# Patient Record
Sex: FEMALE | Race: WHITE | Employment: OTHER | URBAN - METROPOLITAN AREA
[De-identification: names, ages, dates, MRNs, and addresses within clinical notes are randomized per-mention and may not be internally consistent; named-entity substitution may affect disease eponyms.]

---

## 2017-02-20 PROBLEM — R53.83 FATIGUE: Status: ACTIVE | Noted: 2017-02-20

## 2017-08-21 PROBLEM — R42 VERTIGO: Status: ACTIVE | Noted: 2017-08-21

## 2018-01-08 ENCOUNTER — HOSPITAL ENCOUNTER (EMERGENCY)
Facility: HOSPITAL | Age: 83
Discharge: HOME | End: 2018-01-08
Attending: EMERGENCY MEDICINE
Payer: MEDICARE

## 2018-01-08 VITALS
BODY MASS INDEX: 19.63 KG/M2 | HEIGHT: 60 IN | OXYGEN SATURATION: 94 % | RESPIRATION RATE: 14 BRPM | TEMPERATURE: 97 F | WEIGHT: 100 LBS | SYSTOLIC BLOOD PRESSURE: 156 MMHG | DIASTOLIC BLOOD PRESSURE: 73 MMHG | HEART RATE: 96 BPM

## 2018-01-08 DIAGNOSIS — R19.7 DIARRHEA OF PRESUMED INFECTIOUS ORIGIN: Primary | ICD-10-CM

## 2018-01-08 LAB
ALBUMIN SERPL-MCNC: 2.6 G/DL (ref 3.5–4.8)
ALP LIVER SERPL-CCNC: 79 U/L (ref 55–142)
ALT SERPL-CCNC: 23 U/L (ref 14–54)
AST SERPL-CCNC: 34 U/L (ref 15–41)
BASOPHILS # BLD AUTO: 0.02 X10(3) UL (ref 0–0.1)
BASOPHILS NFR BLD AUTO: 0.3 %
BILIRUB SERPL-MCNC: 0.5 MG/DL (ref 0.1–2)
BUN BLD-MCNC: 22 MG/DL (ref 8–20)
CALCIUM BLD-MCNC: 8.4 MG/DL (ref 8.3–10.3)
CHLORIDE: 103 MMOL/L (ref 101–111)
CO2: 30 MMOL/L (ref 22–32)
CREAT BLD-MCNC: 0.63 MG/DL (ref 0.55–1.02)
EOSINOPHIL # BLD AUTO: 0.06 X10(3) UL (ref 0–0.3)
EOSINOPHIL NFR BLD AUTO: 0.8 %
ERYTHROCYTE [DISTWIDTH] IN BLOOD BY AUTOMATED COUNT: 14.9 % (ref 11.5–16)
GLUCOSE BLD-MCNC: 74 MG/DL (ref 70–99)
HCT VFR BLD AUTO: 36.1 % (ref 34–50)
HGB BLD-MCNC: 12.1 G/DL (ref 12–16)
IMMATURE GRANULOCYTE COUNT: 0.02 X10(3) UL (ref 0–1)
IMMATURE GRANULOCYTE RATIO %: 0.3 %
LYMPHOCYTES # BLD AUTO: 0.6 X10(3) UL (ref 0.9–4)
LYMPHOCYTES NFR BLD AUTO: 7.8 %
M PROTEIN MFR SERPL ELPH: 6.1 G/DL (ref 6.1–8.3)
MCH RBC QN AUTO: 32 PG (ref 27–33.2)
MCHC RBC AUTO-ENTMCNC: 33.5 G/DL (ref 31–37)
MCV RBC AUTO: 95.5 FL (ref 81–100)
MONOCYTES # BLD AUTO: 0.77 X10(3) UL (ref 0.1–0.6)
MONOCYTES NFR BLD AUTO: 10 %
NEUTROPHIL ABS PRELIM: 6.21 X10 (3) UL (ref 1.3–6.7)
NEUTROPHILS # BLD AUTO: 6.21 X10(3) UL (ref 1.3–6.7)
NEUTROPHILS NFR BLD AUTO: 80.8 %
PLATELET # BLD AUTO: 177 10(3)UL (ref 150–450)
POTASSIUM SERPL-SCNC: 4.2 MMOL/L (ref 3.6–5.1)
RBC # BLD AUTO: 3.78 X10(6)UL (ref 3.8–5.1)
RED CELL DISTRIBUTION WIDTH-SD: 52 FL (ref 35.1–46.3)
SODIUM SERPL-SCNC: 138 MMOL/L (ref 136–144)
WBC # BLD AUTO: 7.7 X10(3) UL (ref 4–13)

## 2018-01-08 PROCEDURE — 85025 COMPLETE CBC W/AUTO DIFF WBC: CPT | Performed by: EMERGENCY MEDICINE

## 2018-01-08 PROCEDURE — 99284 EMERGENCY DEPT VISIT MOD MDM: CPT

## 2018-01-08 PROCEDURE — 96360 HYDRATION IV INFUSION INIT: CPT

## 2018-01-08 PROCEDURE — 80053 COMPREHEN METABOLIC PANEL: CPT | Performed by: EMERGENCY MEDICINE

## 2018-01-08 PROCEDURE — 96361 HYDRATE IV INFUSION ADD-ON: CPT

## 2018-01-08 RX ORDER — SODIUM CHLORIDE 9 MG/ML
125 INJECTION, SOLUTION INTRAVENOUS CONTINUOUS
Status: DISCONTINUED | OUTPATIENT
Start: 2018-01-08 | End: 2018-01-08

## 2018-01-08 NOTE — ED INITIAL ASSESSMENT (HPI)
Pt states had stomach flu a week ago, Friday had it again, this am started diarrhea this am x3, stated its going around the [facility] home [facility])

## 2018-01-08 NOTE — CM/SW NOTE
Spoke with family at length about care options at home in the event patient needs extra assist. Daughter looking for more home care/clean up, such as laundering and light cleaning.  Per daughter pt does well during day, may have some diarrhea in the evening

## 2018-01-08 NOTE — ED PROVIDER NOTES
Patient Seen in: [facility] Emergency Department    History   Patient presents with:  Nausea/Vomiting/Diarrhea (gastrointestinal)    Stated Complaint: Diarrhea    HPI    24-year-old female, medical history as noted below, here with her daughter with erythematosus ([facility] [address] 12/9/2012   • Urinary tract infection due to ESBL Klebsiella    • Visual impairment        Past Surgical History:  3/11/14: ANGIOPLASTY (CORONARY)      Comment: 80-90% RCAp s/p 4.0 x 23 mm Vision, with stent  1977: HYSTERECTOMY        S DIFFERENTIAL - Abnormal; Notable for the following:     RBC 3.78 (*)     RDW-SD 52.0 (*)     Lymphocyte Absolute 0.60 (*)     Monocyte Absolute 0.77 (*)     All other components within normal limits   CBC WITH DIFFERENTIAL WITH PLATELET    Narrative:     T

## 2018-01-21 ENCOUNTER — APPOINTMENT (OUTPATIENT)
Dept: GENERAL RADIOLOGY | Facility: HOSPITAL | Age: 83
End: 2018-01-21
Attending: EMERGENCY MEDICINE
Payer: MEDICARE

## 2018-01-21 ENCOUNTER — APPOINTMENT (OUTPATIENT)
Dept: ULTRASOUND IMAGING | Facility: HOSPITAL | Age: 83
End: 2018-01-21
Attending: EMERGENCY MEDICINE
Payer: MEDICARE

## 2018-01-21 ENCOUNTER — HOSPITAL ENCOUNTER (EMERGENCY)
Facility: HOSPITAL | Age: 83
Discharge: HOME | End: 2018-01-21
Attending: EMERGENCY MEDICINE
Payer: MEDICARE

## 2018-01-21 VITALS
RESPIRATION RATE: 19 BRPM | WEIGHT: 100.31 LBS | HEIGHT: 58 IN | DIASTOLIC BLOOD PRESSURE: 67 MMHG | OXYGEN SATURATION: 98 % | HEART RATE: 76 BPM | BODY MASS INDEX: 21.06 KG/M2 | SYSTOLIC BLOOD PRESSURE: 134 MMHG

## 2018-01-21 DIAGNOSIS — I82.4Y2 ACUTE DEEP VEIN THROMBOSIS (DVT) OF PROXIMAL VEIN OF LEFT LOWER EXTREMITY (HCC): Primary | ICD-10-CM

## 2018-01-21 LAB
ALBUMIN SERPL-MCNC: 2.3 G/DL (ref 3.5–4.8)
ALP LIVER SERPL-CCNC: 82 U/L (ref 55–142)
ALT SERPL-CCNC: 23 U/L (ref 14–54)
AST SERPL-CCNC: 22 U/L (ref 15–41)
ATRIAL RATE: 89 BPM
BASOPHILS # BLD AUTO: 0.03 X10(3) UL (ref 0–0.1)
BASOPHILS NFR BLD AUTO: 0.4 %
BILIRUB SERPL-MCNC: 0.4 MG/DL (ref 0.1–2)
BUN BLD-MCNC: 20 MG/DL (ref 8–20)
CALCIUM BLD-MCNC: 8 MG/DL (ref 8.3–10.3)
CHLORIDE: 103 MMOL/L (ref 101–111)
CO2: 32 MMOL/L (ref 22–32)
CREAT BLD-MCNC: 0.6 MG/DL (ref 0.55–1.02)
EOSINOPHIL # BLD AUTO: 0.04 X10(3) UL (ref 0–0.3)
EOSINOPHIL NFR BLD AUTO: 0.5 %
ERYTHROCYTE [DISTWIDTH] IN BLOOD BY AUTOMATED COUNT: 15.4 % (ref 11.5–16)
GLUCOSE BLD-MCNC: 86 MG/DL (ref 70–99)
HCT VFR BLD AUTO: 36.2 % (ref 34–50)
HGB BLD-MCNC: 11.9 G/DL (ref 12–16)
IMMATURE GRANULOCYTE COUNT: 0.03 X10(3) UL (ref 0–1)
IMMATURE GRANULOCYTE RATIO %: 0.4 %
LYMPHOCYTES # BLD AUTO: 0.48 X10(3) UL (ref 0.9–4)
LYMPHOCYTES NFR BLD AUTO: 6 %
M PROTEIN MFR SERPL ELPH: 5.7 G/DL (ref 6.1–8.3)
MCH RBC QN AUTO: 31.8 PG (ref 27–33.2)
MCHC RBC AUTO-ENTMCNC: 32.9 G/DL (ref 31–37)
MCV RBC AUTO: 96.8 FL (ref 81–100)
MONOCYTES # BLD AUTO: 0.68 X10(3) UL (ref 0.1–0.6)
MONOCYTES NFR BLD AUTO: 8.4 %
NEUTROPHIL ABS PRELIM: 6.79 X10 (3) UL (ref 1.3–6.7)
NEUTROPHILS # BLD AUTO: 6.79 X10(3) UL (ref 1.3–6.7)
NEUTROPHILS NFR BLD AUTO: 84.3 %
P AXIS: 32 DEGREES
P-R INTERVAL: 176 MS
PLATELET # BLD AUTO: 232 10(3)UL (ref 150–450)
POTASSIUM SERPL-SCNC: 3.7 MMOL/L (ref 3.6–5.1)
PRO-BETA NATRIURETIC PEPTIDE: 1293 PG/ML (ref ?–450)
Q-T INTERVAL: 368 MS
QRS DURATION: 96 MS
QTC CALCULATION (BEZET): 447 MS
R AXIS: -26 DEGREES
RBC # BLD AUTO: 3.74 X10(6)UL (ref 3.8–5.1)
RED CELL DISTRIBUTION WIDTH-SD: 54.5 FL (ref 35.1–46.3)
SODIUM SERPL-SCNC: 140 MMOL/L (ref 136–144)
T AXIS: 19 DEGREES
VENTRICULAR RATE: 89 BPM
WBC # BLD AUTO: 8.1 X10(3) UL (ref 4–13)

## 2018-01-21 PROCEDURE — 71045 X-RAY EXAM CHEST 1 VIEW: CPT | Performed by: EMERGENCY MEDICINE

## 2018-01-21 PROCEDURE — 83880 ASSAY OF NATRIURETIC PEPTIDE: CPT | Performed by: EMERGENCY MEDICINE

## 2018-01-21 PROCEDURE — 36415 COLL VENOUS BLD VENIPUNCTURE: CPT

## 2018-01-21 PROCEDURE — 99285 EMERGENCY DEPT VISIT HI MDM: CPT

## 2018-01-21 PROCEDURE — 80053 COMPREHEN METABOLIC PANEL: CPT | Performed by: EMERGENCY MEDICINE

## 2018-01-21 PROCEDURE — 93010 ELECTROCARDIOGRAM REPORT: CPT

## 2018-01-21 PROCEDURE — 93005 ELECTROCARDIOGRAM TRACING: CPT

## 2018-01-21 PROCEDURE — 85025 COMPLETE CBC W/AUTO DIFF WBC: CPT | Performed by: EMERGENCY MEDICINE

## 2018-01-21 PROCEDURE — 93970 EXTREMITY STUDY: CPT | Performed by: EMERGENCY MEDICINE

## 2018-01-21 NOTE — CM/SW NOTE
Emergency Department Discharge Plan    Sheridan Newberry is a 80year old female who presented to the ED with DVT. ED Case Manager was asked to assist in arranging for home anticoagulation.     Sheridan Newberry and I discussed indications for anticoagulat

## 2018-01-21 NOTE — ED INITIAL ASSESSMENT (HPI)
Pt here for bilateral lower extremity swelling. Pt doubled her lasix to decrease swelling and it did not change.  Pt denies any SOB was directed to come in by cardiologist.

## 2018-01-21 NOTE — CONSULTS
[facility] Cardiology Consultation    Pascale Vidal Patient Status:  Emergency    10/1/1924 MRN [id number]   Location 6[address] [facility] Attending Alexandra Rosas MD   Hosp Day # 0 PCP Natividad Painter MD     Reason for Consultation:  Abhijit bolden fibromyalgia         Allergies:    Lexapro [Escitalopr*    Nausea only  Methotrexate              Sulfa Antibiotics           Medications:  • rivaroxaban  15 mg Oral Daily with food       Continuous Infusions:      Social History:   reports that she quit s Has minimal symptoms     Agree d/c planning- will be safer in her home environment      Mirza Horn  1/21/2018  2:42 PM

## 2018-01-21 NOTE — ED PROVIDER NOTES
Patient Seen in: [facility] Emergency Department    History   Patient presents with:  Swelling Edema (cardiovascular, metabolic)    Stated Complaint: pedal edema/    HPI    24-year-old female presents emergency department complaining of bilateral lo Smoker                                                              Packs/day: 0.00      Years: 0.00         Quit date: 1/1/1968  Smokeless tobacco: Never Used                      Alcohol use:  No                Review of Systems    Positive for stated com W/ DIFFERENTIAL - Abnormal; Notable for the following:     RBC 3.74 (*)     HGB 11.9 (*)     RDW-SD 54.5 (*)     Neutrophil Absolute Prelim 6.79 (*)     Neutrophil Absolute 6.79 (*)     Lymphocyte Absolute 0.48 (*)     Monocyte Absolute 0.68 (*)     All ot visit.  There is no disposition time on file for this visit.     Follow-up:  Marcie Krabbe, [address]  [phone]              Medications Prescribed:  Current Discharge Medication List    START taking these medications    ri

## 2018-02-02 ENCOUNTER — APPOINTMENT (OUTPATIENT)
Dept: GENERAL RADIOLOGY | Facility: HOSPITAL | Age: 83
End: 2018-02-02
Attending: EMERGENCY MEDICINE
Payer: MEDICARE

## 2018-02-02 ENCOUNTER — APPOINTMENT (OUTPATIENT)
Dept: CT IMAGING | Facility: HOSPITAL | Age: 83
End: 2018-02-02
Attending: EMERGENCY MEDICINE
Payer: MEDICARE

## 2018-02-02 ENCOUNTER — HOSPITAL ENCOUNTER (EMERGENCY)
Facility: HOSPITAL | Age: 83
Discharge: HOME | End: 2018-02-02
Attending: EMERGENCY MEDICINE
Payer: MEDICARE

## 2018-02-02 VITALS
BODY MASS INDEX: 20.75 KG/M2 | HEIGHT: 59 IN | TEMPERATURE: 98 F | RESPIRATION RATE: 20 BRPM | WEIGHT: 102.94 LBS | DIASTOLIC BLOOD PRESSURE: 62 MMHG | SYSTOLIC BLOOD PRESSURE: 99 MMHG | OXYGEN SATURATION: 100 % | HEART RATE: 103 BPM

## 2018-02-02 DIAGNOSIS — R60.0 EDEMA OF UPPER EXTREMITY: Primary | ICD-10-CM

## 2018-02-02 LAB
ALBUMIN SERPL-MCNC: 2.2 G/DL (ref 3.5–4.8)
ALP LIVER SERPL-CCNC: 98 U/L (ref 55–142)
ALT SERPL-CCNC: 24 U/L (ref 14–54)
AST SERPL-CCNC: 26 U/L (ref 15–41)
ATRIAL RATE: 81 BPM
BASOPHILS # BLD AUTO: 0.01 X10(3) UL (ref 0–0.1)
BASOPHILS NFR BLD AUTO: 0.1 %
BILIRUB SERPL-MCNC: 0.3 MG/DL (ref 0.1–2)
BUN BLD-MCNC: 20 MG/DL (ref 8–20)
CALCIUM BLD-MCNC: 7.9 MG/DL (ref 8.3–10.3)
CHLORIDE: 104 MMOL/L (ref 101–111)
CO2: 26 MMOL/L (ref 22–32)
CREAT BLD-MCNC: 0.6 MG/DL (ref 0.55–1.02)
EOSINOPHIL # BLD AUTO: 0.01 X10(3) UL (ref 0–0.3)
EOSINOPHIL NFR BLD AUTO: 0.1 %
ERYTHROCYTE [DISTWIDTH] IN BLOOD BY AUTOMATED COUNT: 16.5 % (ref 11.5–16)
GLUCOSE BLD-MCNC: 118 MG/DL (ref 70–99)
HCT VFR BLD AUTO: 33.3 % (ref 34–50)
HGB BLD-MCNC: 10.7 G/DL (ref 12–16)
IMMATURE GRANULOCYTE COUNT: 0.01 X10(3) UL (ref 0–1)
IMMATURE GRANULOCYTE RATIO %: 0.1 %
LYMPHOCYTES # BLD AUTO: 0.19 X10(3) UL (ref 0.9–4)
LYMPHOCYTES NFR BLD AUTO: 2.7 %
M PROTEIN MFR SERPL ELPH: 5.7 G/DL (ref 6.1–8.3)
MCH RBC QN AUTO: 31.9 PG (ref 27–33.2)
MCHC RBC AUTO-ENTMCNC: 32.1 G/DL (ref 31–37)
MCV RBC AUTO: 99.4 FL (ref 81–100)
MONOCYTES # BLD AUTO: 0.32 X10(3) UL (ref 0.1–0.6)
MONOCYTES NFR BLD AUTO: 4.5 %
NEUTROPHIL ABS PRELIM: 6.6 X10 (3) UL (ref 1.3–6.7)
NEUTROPHILS # BLD AUTO: 6.6 X10(3) UL (ref 1.3–6.7)
NEUTROPHILS NFR BLD AUTO: 92.5 %
P AXIS: 46 DEGREES
P-R INTERVAL: 198 MS
PLATELET # BLD AUTO: 267 10(3)UL (ref 150–450)
POTASSIUM SERPL-SCNC: 4 MMOL/L (ref 3.6–5.1)
Q-T INTERVAL: 394 MS
QRS DURATION: 102 MS
QTC CALCULATION (BEZET): 457 MS
R AXIS: -23 DEGREES
RBC # BLD AUTO: 3.35 X10(6)UL (ref 3.8–5.1)
RED CELL DISTRIBUTION WIDTH-SD: 59.4 FL (ref 35.1–46.3)
SODIUM SERPL-SCNC: 137 MMOL/L (ref 136–144)
T AXIS: 40 DEGREES
VENTRICULAR RATE: 81 BPM
WBC # BLD AUTO: 7.1 X10(3) UL (ref 4–13)

## 2018-02-02 PROCEDURE — 85025 COMPLETE CBC W/AUTO DIFF WBC: CPT | Performed by: EMERGENCY MEDICINE

## 2018-02-02 PROCEDURE — 71260 CT THORAX DX C+: CPT | Performed by: EMERGENCY MEDICINE

## 2018-02-02 PROCEDURE — 93010 ELECTROCARDIOGRAM REPORT: CPT

## 2018-02-02 PROCEDURE — 93005 ELECTROCARDIOGRAM TRACING: CPT

## 2018-02-02 PROCEDURE — 80053 COMPREHEN METABOLIC PANEL: CPT | Performed by: EMERGENCY MEDICINE

## 2018-02-02 PROCEDURE — 71045 X-RAY EXAM CHEST 1 VIEW: CPT | Performed by: EMERGENCY MEDICINE

## 2018-02-02 PROCEDURE — 36415 COLL VENOUS BLD VENIPUNCTURE: CPT

## 2018-02-02 PROCEDURE — 99285 EMERGENCY DEPT VISIT HI MDM: CPT

## 2018-02-02 NOTE — ED NOTES
Pt resting comfortably in room, family at bedside, vitals stable, Pt in no acute distress at this time

## 2018-02-02 NOTE — ED INITIAL ASSESSMENT (HPI)
Pt was diagnosed with a DVT of the left left 2 weeks ago. PT having some pain in the left arm and wrist.  Pt had an ultrasound which was negative for a DVT but showed some possible stenosis of the vessels.   Pt was instructed today to follow up with Dr. Tabitha Rivera

## 2018-02-02 NOTE — ED PROVIDER NOTES
Patient Seen in: [facility] Emergency Department    History   Patient presents with:  Swelling Edema (cardiovascular, metabolic)    Stated Complaint: swelling edema, sent by by cardiology    HPI    51-year-old female here with her family concerned a impairment        Past Surgical History:  3/11/14: ANGIOPLASTY (CORONARY)      Comment: 80-90% RCAp s/p 4.0 x 23 mm Vision, with stent  1977: HYSTERECTOMY        Smoking status: Former Smoker                                                              Pac cords.    ED Course     Labs Reviewed   COMP METABOLIC PANEL (14) - Abnormal; Notable for the following:        Result Value    Glucose 118 (*)     Calcium, Total 7.9 (*)     Total Protein 5.7 (*)     Albumin 2.2 (*)     All other components within normal changes of the thoracic spine. Dictated by: Claire Waite DO on 2/02/2018 at 15:41     Approved by: Claire Waite DO            Us Venous Doppler Arm Left (cpt=93971)    Result Date: 1/31/2018  IMPRESSION:  1.  No thrombus in the veins of the left upper

## 2018-02-05 PROBLEM — I82.412 LEFT FEMORAL VEIN DVT (HCC): Status: ACTIVE | Noted: 2018-02-05

## 2018-02-05 PROBLEM — R60.0 BILATERAL LOWER EXTREMITY EDEMA: Status: ACTIVE | Noted: 2018-02-05

## 2018-02-13 PROBLEM — L03.90 CELLULITIS: Status: ACTIVE | Noted: 2018-02-13

## 2018-03-06 ENCOUNTER — NURSE ONLY (OUTPATIENT)
Dept: LAB | Age: 83
End: 2018-03-06
Attending: FAMILY MEDICINE
Payer: MEDICARE

## 2018-03-06 ENCOUNTER — HOSPITAL ENCOUNTER (OUTPATIENT)
Dept: INTERVENTIONAL RADIOLOGY/VASCULAR | Facility: HOSPITAL | Age: 83
Discharge: HOME | End: 2018-03-06
Attending: SURGERY | Admitting: SURGERY
Payer: MEDICARE

## 2018-03-06 VITALS
DIASTOLIC BLOOD PRESSURE: 36 MMHG | HEART RATE: 77 BPM | TEMPERATURE: 97 F | SYSTOLIC BLOOD PRESSURE: 114 MMHG | RESPIRATION RATE: 26 BRPM | OXYGEN SATURATION: 97 %

## 2018-03-06 DIAGNOSIS — M32.9 LUPUS (HCC): ICD-10-CM

## 2018-03-06 DIAGNOSIS — I82.412 ACUTE DEEP VEIN THROMBOSIS (DVT) OF FEMORAL VEIN OF LEFT LOWER EXTREMITY (HCC): ICD-10-CM

## 2018-03-06 DIAGNOSIS — E03.9 HYPOTHYROIDISM, ADULT: Primary | ICD-10-CM

## 2018-03-06 LAB
25-HYDROXYVITAMIN D (TOTAL): 26.8 NG/ML (ref 30–100)
ALBUMIN SERPL-MCNC: 2.5 G/DL (ref 3.5–4.8)
ALP LIVER SERPL-CCNC: 91 U/L (ref 55–142)
ALT SERPL-CCNC: 20 U/L (ref 14–54)
AST SERPL-CCNC: 27 U/L (ref 15–41)
BASOPHILS # BLD AUTO: 0.01 X10(3) UL (ref 0–0.1)
BASOPHILS # BLD AUTO: 0.02 X10(3) UL (ref 0–0.1)
BASOPHILS NFR BLD AUTO: 0.2 %
BASOPHILS NFR BLD AUTO: 0.4 %
BILIRUB SERPL-MCNC: 0.3 MG/DL (ref 0.1–2)
BUN BLD-MCNC: 21 MG/DL (ref 8–20)
BUN BLD-MCNC: 21 MG/DL (ref 8–20)
CALCIUM BLD-MCNC: 8.4 MG/DL (ref 8.3–10.3)
CALCIUM BLD-MCNC: 8.5 MG/DL (ref 8.3–10.3)
CHLORIDE: 107 MMOL/L (ref 101–111)
CHLORIDE: 108 MMOL/L (ref 101–111)
CHOLEST SMN-MCNC: 125 MG/DL (ref ?–200)
CO2: 26 MMOL/L (ref 22–32)
CO2: 29 MMOL/L (ref 22–32)
CREAT BLD-MCNC: 0.5 MG/DL (ref 0.55–1.02)
CREAT BLD-MCNC: 0.55 MG/DL (ref 0.55–1.02)
EOSINOPHIL # BLD AUTO: 0.01 X10(3) UL (ref 0–0.3)
EOSINOPHIL # BLD AUTO: 0.01 X10(3) UL (ref 0–0.3)
EOSINOPHIL NFR BLD AUTO: 0.2 %
EOSINOPHIL NFR BLD AUTO: 0.2 %
ERYTHROCYTE [DISTWIDTH] IN BLOOD BY AUTOMATED COUNT: 16.6 % (ref 11.5–16)
ERYTHROCYTE [DISTWIDTH] IN BLOOD BY AUTOMATED COUNT: 16.7 % (ref 11.5–16)
FOLATE (FOLIC ACID), SERUM: 38.3 NG/ML (ref 8.7–24)
GLUCOSE BLD-MCNC: 79 MG/DL (ref 70–99)
GLUCOSE BLD-MCNC: 79 MG/DL (ref 70–99)
HAV AB SERPL IA-ACNC: 399 PG/ML (ref 193–986)
HCT VFR BLD AUTO: 31.2 % (ref 34–50)
HCT VFR BLD AUTO: 33.2 % (ref 34–50)
HDLC SERPL-MCNC: 63 MG/DL (ref 45–?)
HDLC SERPL: 1.98 {RATIO} (ref ?–4.44)
HGB BLD-MCNC: 10 G/DL (ref 12–16)
HGB BLD-MCNC: 10.4 G/DL (ref 12–16)
IMMATURE GRANULOCYTE COUNT: 0.01 X10(3) UL (ref 0–1)
IMMATURE GRANULOCYTE COUNT: 0.05 X10(3) UL (ref 0–1)
IMMATURE GRANULOCYTE RATIO %: 0.2 %
IMMATURE GRANULOCYTE RATIO %: 1 %
LDLC SERPL CALC-MCNC: 52 MG/DL (ref ?–130)
LYMPHOCYTES # BLD AUTO: 0.32 X10(3) UL (ref 0.9–4)
LYMPHOCYTES # BLD AUTO: 0.64 X10(3) UL (ref 0.9–4)
LYMPHOCYTES NFR BLD AUTO: 12.4 %
LYMPHOCYTES NFR BLD AUTO: 7.4 %
M PROTEIN MFR SERPL ELPH: 6.4 G/DL (ref 6.1–8.3)
MCH RBC QN AUTO: 31.7 PG (ref 27–33.2)
MCH RBC QN AUTO: 32.9 PG (ref 27–33.2)
MCHC RBC AUTO-ENTMCNC: 31.3 G/DL (ref 31–37)
MCHC RBC AUTO-ENTMCNC: 32.1 G/DL (ref 31–37)
MCV RBC AUTO: 101.2 FL (ref 81–100)
MCV RBC AUTO: 102.6 FL (ref 81–100)
MONOCYTES # BLD AUTO: 0.31 X10(3) UL (ref 0.1–1)
MONOCYTES # BLD AUTO: 0.33 X10(3) UL (ref 0.1–1)
MONOCYTES NFR BLD AUTO: 6.4 %
MONOCYTES NFR BLD AUTO: 7.1 %
NEUTROPHIL ABS PRELIM: 3.69 X10 (3) UL (ref 1.3–6.7)
NEUTROPHIL ABS PRELIM: 4.1 X10 (3) UL (ref 1.3–6.7)
NEUTROPHILS # BLD AUTO: 3.69 X10(3) UL (ref 1.3–6.7)
NEUTROPHILS # BLD AUTO: 4.1 X10(3) UL (ref 1.3–6.7)
NEUTROPHILS NFR BLD AUTO: 79.6 %
NEUTROPHILS NFR BLD AUTO: 84.9 %
NONHDLC SERPL-MCNC: 62 MG/DL (ref ?–130)
PLATELET # BLD AUTO: 221 10(3)UL (ref 150–450)
PLATELET # BLD AUTO: 227 10(3)UL (ref 150–450)
POTASSIUM SERPL-SCNC: 3.6 MMOL/L (ref 3.6–5.1)
POTASSIUM SERPL-SCNC: 4 MMOL/L (ref 3.6–5.1)
RBC # BLD AUTO: 3.04 X10(6)UL (ref 3.8–5.1)
RBC # BLD AUTO: 3.28 X10(6)UL (ref 3.8–5.1)
RED CELL DISTRIBUTION WIDTH-SD: 62.4 FL (ref 35.1–46.3)
RED CELL DISTRIBUTION WIDTH-SD: 63.4 FL (ref 35.1–46.3)
SODIUM SERPL-SCNC: 139 MMOL/L (ref 136–144)
SODIUM SERPL-SCNC: 142 MMOL/L (ref 136–144)
THYROXINE (T4): 11.5 UG/DL (ref 4.5–10.9)
TRIGL SERPL-MCNC: 52 MG/DL (ref ?–150)
TSI SER-ACNC: 2.04 MIU/ML (ref 0.35–5.5)
VLDLC SERPL CALC-MCNC: 10 MG/DL (ref 5–40)
WBC # BLD AUTO: 4.4 X10(3) UL (ref 4–13)
WBC # BLD AUTO: 5.2 X10(3) UL (ref 4–13)

## 2018-03-06 PROCEDURE — 82306 VITAMIN D 25 HYDROXY: CPT

## 2018-03-06 PROCEDURE — 80048 BASIC METABOLIC PNL TOTAL CA: CPT | Performed by: SURGERY

## 2018-03-06 PROCEDURE — 80061 LIPID PANEL: CPT

## 2018-03-06 PROCEDURE — 82607 VITAMIN B-12: CPT

## 2018-03-06 PROCEDURE — 36415 COLL VENOUS BLD VENIPUNCTURE: CPT

## 2018-03-06 PROCEDURE — 84443 ASSAY THYROID STIM HORMONE: CPT

## 2018-03-06 PROCEDURE — 80053 COMPREHEN METABOLIC PANEL: CPT | Performed by: SURGERY

## 2018-03-06 PROCEDURE — 82746 ASSAY OF FOLIC ACID SERUM: CPT

## 2018-03-06 PROCEDURE — 85025 COMPLETE CBC W/AUTO DIFF WBC: CPT

## 2018-03-06 PROCEDURE — 85025 COMPLETE CBC W/AUTO DIFF WBC: CPT | Performed by: SURGERY

## 2018-03-06 PROCEDURE — 06H03DZ INSERTION OF INTRALUMINAL DEVICE INTO INFERIOR VENA CAVA, PERCUTANEOUS APPROACH: ICD-10-PCS | Performed by: SURGERY

## 2018-03-06 PROCEDURE — 37191 INS ENDOVAS VENA CAVA FILTR: CPT

## 2018-03-06 PROCEDURE — 84436 ASSAY OF TOTAL THYROXINE: CPT

## 2018-03-06 RX ORDER — ACETAMINOPHEN 325 MG/1
650 TABLET ORAL EVERY 4 HOURS PRN
Status: DISCONTINUED | OUTPATIENT
Start: 2018-03-06 | End: 2018-03-06

## 2018-03-06 RX ORDER — LIDOCAINE HYDROCHLORIDE 10 MG/ML
INJECTION, SOLUTION INFILTRATION; PERINEURAL
Status: COMPLETED
Start: 2018-03-06 | End: 2018-03-06

## 2018-03-06 RX ORDER — HYDROCODONE BITARTRATE AND ACETAMINOPHEN 5; 325 MG/1; MG/1
2 TABLET ORAL EVERY 4 HOURS PRN
Status: DISCONTINUED | OUTPATIENT
Start: 2018-03-06 | End: 2018-03-06

## 2018-03-06 RX ORDER — HEPARIN SODIUM 5000 [USP'U]/ML
INJECTION, SOLUTION INTRAVENOUS; SUBCUTANEOUS
Status: COMPLETED
Start: 2018-03-06 | End: 2018-03-06

## 2018-03-06 RX ORDER — MIDAZOLAM HYDROCHLORIDE 1 MG/ML
INJECTION INTRAMUSCULAR; INTRAVENOUS
Status: DISCONTINUED
Start: 2018-03-06 | End: 2018-03-06 | Stop reason: WASHOUT

## 2018-03-06 RX ORDER — SODIUM CHLORIDE 9 MG/ML
INJECTION, SOLUTION INTRAVENOUS CONTINUOUS
Status: DISCONTINUED | OUTPATIENT
Start: 2018-03-06 | End: 2018-03-06

## 2018-03-06 RX ORDER — HYDROCODONE BITARTRATE AND ACETAMINOPHEN 5; 325 MG/1; MG/1
1 TABLET ORAL EVERY 4 HOURS PRN
Status: DISCONTINUED | OUTPATIENT
Start: 2018-03-06 | End: 2018-03-06

## 2018-03-06 RX ADMIN — SODIUM CHLORIDE: 9 INJECTION, SOLUTION INTRAVENOUS at 11:00:00

## 2018-03-06 NOTE — H&P
[facility], [address] [facility]  [facility]     VASCULAR SURGERY CONSULT NOTE        Name: Tammy Marin   :   10/1/1924  [id number]      REFERRING PHYSICIAN:  Robin Self Referred  PRIMA unspecified hyperlipidemia     • Personal history of other musculoskeletal disorders(V13.59)       RLS   • Pneumonia, organism unspecified(486)     • Pulmonary embolism (HCC)     • Restless leg syndrome     • Rheumatoid arthritis(714.0)     • Systemic lupu Lupus flare, take 2 tablets daily for four days. (Patient taking differently: 5 mg daily. Take 1/2 tablet daily.  When having Lupus flare, take 2 tablets daily for four days. ), Disp: 90 tablet, Rfl: 0     ALLERGIES:    She is allergic to lexapro [employer]       ASSESSMENT  Left femoral deep venous thrombosis with inability to tolerate anticoagulation     I had a prolonged discussion with the patient and her daughters.   I explained to them that I feel that she likely has developed bleeding into her left up

## 2018-03-06 NOTE — BRIEF OP NOTE
[facility]  Brief Endovascular Procedure Note     Michal Halsted Location: CATH LAB   CSN [id number] MRN [id number]   Admission Date 3/6/2018 Operation Date 3/6/2018   Attending Physician Brook Flynn MD Operating Physician Gladys Stevenson MD

## 2018-03-06 NOTE — PROGRESS NOTES
Pt up to bathroom with two RN's. After pt assisted back to bed, right groin assessed. No bleeding or hematoma. +1 pedal. IV removed. Discharge instructions reviewed with pt and pt famiily memebe. Copy given. Pt discharged via wheelchair.  Pt family member d

## 2018-03-07 NOTE — OPERATIVE REPORT
[address]    PATIENT'S NAME: RICARDA PEREIRA   ATTENDING PHYSICIAN: Laci Chacko M.D. OPERATING PHYSICIAN: Laci Chacko M.D.    PATIENT ACCOUNT#:   [de-identified]    LOCATION:  [facility] 2 EDW 10  MEDICAL RECORD #:   [id number]       DATE OF patient was brought to the catheterization lab, laid supine on the table. After induction of sedation, the right groin area was then prepped and draped in the usual surgical sterile fashion.   After a time-out was called, a 1% lidocaine solution was then u there were no complications. Dictated By Wade Arriola.  Judy Inman M.D.  d: 03/06/2018 11:09:01  t: 03/06/2018 12:31:27  [facility] [id number]  [facility]/

## 2018-03-30 ENCOUNTER — NURSE ONLY (OUTPATIENT)
Dept: LAB | Age: 83
End: 2018-03-30
Attending: FAMILY MEDICINE
Payer: MEDICARE

## 2018-03-30 DIAGNOSIS — E03.9 HYPOTHYROIDISM, ADULT: Primary | ICD-10-CM

## 2018-03-30 DIAGNOSIS — M32.9 LUPUS (HCC): ICD-10-CM

## 2018-03-30 PROCEDURE — 84436 ASSAY OF TOTAL THYROXINE: CPT

## 2018-03-30 PROCEDURE — 36415 COLL VENOUS BLD VENIPUNCTURE: CPT

## 2018-03-31 ENCOUNTER — HOSPITAL ENCOUNTER (EMERGENCY)
Facility: HOSPITAL | Age: 83
Discharge: HOME | End: 2018-03-31
Attending: EMERGENCY MEDICINE
Payer: MEDICARE

## 2018-03-31 VITALS
WEIGHT: 103 LBS | OXYGEN SATURATION: 95 % | RESPIRATION RATE: 17 BRPM | SYSTOLIC BLOOD PRESSURE: 133 MMHG | HEART RATE: 88 BPM | TEMPERATURE: 98 F | HEIGHT: 59 IN | BODY MASS INDEX: 20.76 KG/M2 | DIASTOLIC BLOOD PRESSURE: 59 MMHG

## 2018-03-31 DIAGNOSIS — S51.812A SKIN TEAR OF LEFT FOREARM WITHOUT COMPLICATION, INITIAL ENCOUNTER: Primary | ICD-10-CM

## 2018-03-31 PROCEDURE — 99282 EMERGENCY DEPT VISIT SF MDM: CPT

## 2018-03-31 NOTE — ED PROVIDER NOTES
Patient Seen in: [facility] Emergency Department    History   Patient presents with:  Laceration Abrasion (integumentary)    Stated Complaint: LAC    HPI    51-year-old female presents emergency room with chief complaint of laceration to the left fo HYSTERECTOMY        Smoking status: Former Smoker                                                              Packs/day: 0.00      Years: 0.00         Quit date: 1/1/1968  Smokeless tobacco: Never Used                      Alcohol use:  No                R Clinical Impression:  Skin tear of left forearm without complication, initial encounter  (primary encounter diagnosis)    Disposition:  Discharge  3/31/2018  9:36 am    Follow-up:  Olaf Gross [address] [place] [address]

## 2018-03-31 NOTE — ED INITIAL ASSESSMENT (HPI)
Pt comes to ED with lac to left forearm she states she states she cut her arm on the bathroom counter last night.

## 2018-08-17 ENCOUNTER — HOSPITAL ENCOUNTER (INPATIENT)
Facility: HOSPITAL | Age: 83
LOS: 5 days | Discharge: HOME HEALTH | DRG: 178 | End: 2018-08-22
Attending: EMERGENCY MEDICINE | Admitting: HOSPITALIST
Payer: MEDICARE

## 2018-08-17 ENCOUNTER — APPOINTMENT (OUTPATIENT)
Dept: GENERAL RADIOLOGY | Facility: HOSPITAL | Age: 83
DRG: 178 | End: 2018-08-17
Attending: EMERGENCY MEDICINE
Payer: MEDICARE

## 2018-08-17 DIAGNOSIS — R09.02 HYPOXIA: ICD-10-CM

## 2018-08-17 DIAGNOSIS — I50.9 ACUTE ON CHRONIC CONGESTIVE HEART FAILURE, UNSPECIFIED HEART FAILURE TYPE (HCC): Primary | ICD-10-CM

## 2018-08-17 DIAGNOSIS — R77.8 ELEVATED TROPONIN: ICD-10-CM

## 2018-08-17 DIAGNOSIS — I95.9 HYPOTENSION, UNSPECIFIED HYPOTENSION TYPE: ICD-10-CM

## 2018-08-17 PROBLEM — R79.89 AZOTEMIA: Status: ACTIVE | Noted: 2018-08-17

## 2018-08-17 LAB
ALBUMIN SERPL-MCNC: 2.5 G/DL (ref 3.5–4.8)
ALBUMIN/GLOB SERPL: 0.8 {RATIO} (ref 1–2)
ALP LIVER SERPL-CCNC: 77 U/L (ref 55–142)
ALT SERPL-CCNC: 15 U/L (ref 14–54)
ANION GAP SERPL CALC-SCNC: 9 MMOL/L (ref 0–18)
AST SERPL-CCNC: 15 U/L (ref 15–41)
BASOPHILS # BLD AUTO: 0.03 X10(3) UL (ref 0–0.1)
BASOPHILS NFR BLD AUTO: 0.2 %
BILIRUB SERPL-MCNC: 0.5 MG/DL (ref 0.1–2)
BUN BLD-MCNC: 36 MG/DL (ref 8–20)
BUN/CREAT SERPL: 36 (ref 10–20)
CALCIUM BLD-MCNC: 7.9 MG/DL (ref 8.3–10.3)
CHLORIDE SERPL-SCNC: 104 MMOL/L (ref 101–111)
CO2 SERPL-SCNC: 26 MMOL/L (ref 22–32)
CREAT BLD-MCNC: 1 MG/DL (ref 0.55–1.02)
EOSINOPHIL # BLD AUTO: 0 X10(3) UL (ref 0–0.3)
EOSINOPHIL NFR BLD AUTO: 0 %
ERYTHROCYTE [DISTWIDTH] IN BLOOD BY AUTOMATED COUNT: 13.9 % (ref 11.5–16)
GLOBULIN PLAS-MCNC: 3.1 G/DL (ref 2.5–4)
GLUCOSE BLD-MCNC: 95 MG/DL (ref 70–99)
HCT VFR BLD AUTO: 34.6 % (ref 34–50)
HGB BLD-MCNC: 11.1 G/DL (ref 12–16)
IMMATURE GRANULOCYTE COUNT: 0.07 X10(3) UL (ref 0–1)
IMMATURE GRANULOCYTE RATIO %: 0.6 %
LYMPHOCYTES # BLD AUTO: 0.65 X10(3) UL (ref 0.9–4)
LYMPHOCYTES NFR BLD AUTO: 5.3 %
M PROTEIN MFR SERPL ELPH: 5.6 G/DL (ref 6.1–8.3)
MCH RBC QN AUTO: 31.9 PG (ref 27–33.2)
MCHC RBC AUTO-ENTMCNC: 32.1 G/DL (ref 31–37)
MCV RBC AUTO: 99.4 FL (ref 81–100)
MONOCYTES # BLD AUTO: 0.92 X10(3) UL (ref 0.1–1)
MONOCYTES NFR BLD AUTO: 7.6 %
NEUTROPHIL ABS PRELIM: 10.49 X10 (3) UL (ref 1.3–6.7)
NEUTROPHILS # BLD AUTO: 10.49 X10(3) UL (ref 1.3–6.7)
NEUTROPHILS NFR BLD AUTO: 86.3 %
OSMOLALITY SERPL CALC.SUM OF ELEC: 296 MOSM/KG (ref 275–295)
PLATELET # BLD AUTO: 180 10(3)UL (ref 150–450)
POTASSIUM SERPL-SCNC: 3.9 MMOL/L (ref 3.6–5.1)
PRO-BETA NATRIURETIC PEPTIDE: 4336 PG/ML (ref ?–450)
RBC # BLD AUTO: 3.48 X10(6)UL (ref 3.8–5.1)
RED CELL DISTRIBUTION WIDTH-SD: 50.6 FL (ref 35.1–46.3)
SODIUM SERPL-SCNC: 139 MMOL/L (ref 136–144)
TROPONIN I SERPL-MCNC: 0.18 NG/ML (ref ?–0.05)
WBC # BLD AUTO: 12.2 X10(3) UL (ref 4–13)

## 2018-08-17 PROCEDURE — 99223 1ST HOSP IP/OBS HIGH 75: CPT | Performed by: HOSPITALIST

## 2018-08-17 PROCEDURE — 71045 X-RAY EXAM CHEST 1 VIEW: CPT | Performed by: EMERGENCY MEDICINE

## 2018-08-17 RX ORDER — FUROSEMIDE 40 MG/1
40 TABLET ORAL EVERY MORNING
Status: DISCONTINUED | OUTPATIENT
Start: 2018-08-18 | End: 2018-08-18

## 2018-08-17 RX ORDER — HEPARIN SODIUM 5000 [USP'U]/ML
5000 INJECTION, SOLUTION INTRAVENOUS; SUBCUTANEOUS EVERY 12 HOURS SCHEDULED
Status: DISCONTINUED | OUTPATIENT
Start: 2018-08-18 | End: 2018-08-22

## 2018-08-17 RX ORDER — ACETAMINOPHEN 325 MG/1
650 TABLET ORAL EVERY 6 HOURS PRN
Status: DISCONTINUED | OUTPATIENT
Start: 2018-08-17 | End: 2018-08-22

## 2018-08-17 RX ORDER — METOCLOPRAMIDE HYDROCHLORIDE 5 MG/ML
10 INJECTION INTRAMUSCULAR; INTRAVENOUS EVERY 8 HOURS PRN
Status: DISCONTINUED | OUTPATIENT
Start: 2018-08-17 | End: 2018-08-17

## 2018-08-17 RX ORDER — MELATONIN
325 2 TIMES DAILY WITH MEALS
Status: DISCONTINUED | OUTPATIENT
Start: 2018-08-17 | End: 2018-08-22

## 2018-08-17 RX ORDER — ONDANSETRON 2 MG/ML
4 INJECTION INTRAMUSCULAR; INTRAVENOUS ONCE
Status: DISCONTINUED | OUTPATIENT
Start: 2018-08-17 | End: 2018-08-17

## 2018-08-17 RX ORDER — MULTIVITAMIN/IRON/FOLIC ACID 18MG-0.4MG
250 TABLET ORAL DAILY
Status: DISCONTINUED | OUTPATIENT
Start: 2018-08-17 | End: 2018-08-22

## 2018-08-17 RX ORDER — ASPIRIN 81 MG/1
81 TABLET ORAL DAILY
Status: DISCONTINUED | OUTPATIENT
Start: 2018-08-18 | End: 2018-08-22

## 2018-08-17 RX ORDER — TRAZODONE HYDROCHLORIDE 50 MG/1
50 TABLET ORAL NIGHTLY PRN
Status: DISCONTINUED | OUTPATIENT
Start: 2018-08-17 | End: 2018-08-22

## 2018-08-17 RX ORDER — LEVOTHYROXINE SODIUM 0.07 MG/1
75 TABLET ORAL
Status: DISCONTINUED | OUTPATIENT
Start: 2018-08-17 | End: 2018-08-22

## 2018-08-17 RX ORDER — MIRTAZAPINE 15 MG/1
7.5 TABLET, FILM COATED ORAL NIGHTLY
Status: DISCONTINUED | OUTPATIENT
Start: 2018-08-17 | End: 2018-08-22

## 2018-08-17 RX ORDER — ALPRAZOLAM 0.25 MG/1
0.25 TABLET ORAL NIGHTLY PRN
Status: DISCONTINUED | OUTPATIENT
Start: 2018-08-17 | End: 2018-08-22

## 2018-08-17 RX ORDER — METOCLOPRAMIDE HYDROCHLORIDE 5 MG/ML
5 INJECTION INTRAMUSCULAR; INTRAVENOUS EVERY 8 HOURS PRN
Status: DISCONTINUED | OUTPATIENT
Start: 2018-08-17 | End: 2018-08-22

## 2018-08-17 RX ORDER — PRAMIPEXOLE DIHYDROCHLORIDE 1 MG/1
0.5 TABLET ORAL 2 TIMES DAILY
Status: DISCONTINUED | OUTPATIENT
Start: 2018-08-17 | End: 2018-08-22

## 2018-08-17 RX ORDER — ONDANSETRON 2 MG/ML
4 INJECTION INTRAMUSCULAR; INTRAVENOUS EVERY 6 HOURS PRN
Status: DISCONTINUED | OUTPATIENT
Start: 2018-08-17 | End: 2018-08-22

## 2018-08-17 NOTE — ED PROVIDER NOTES
Patient Seen in: [facility] Emergency Department    History   Patient presents with:  Hypotension (cardiovascular)  Nausea/Vomiting/Diarrhea (gastrointestinal)  Dehydration (metabolic/constitutional)    Stated Complaint: vomiting, hypotension    HPI [facility])    • Restless leg syndrome    • Rheumatoid arthritis(714.0)    • Systemic lupus erythematosus ([facility] [address] 12/9/2012   • Urinary tract infection due to ESBL Klebsiella    • Visual impairment        Past Surgical History:  3/11/14: ANGIOPLASTY (CORONARY) within normal limits   URINALYSIS WITH CULTURE REFLEX - Abnormal; Notable for the following:     Clarity Urine Hazy (*)     Ketones Urine Trace (*)     Leukocyte Esterase Urine Moderate (*)     WBC Urine 21-50 (*)     RBC URINE 3-5 (*)     Hyaline Casts Pr 0415  ------------------------------------------------------------  XR CHEST AP PORTABLE  (CPT=71045)   Final Result    PROCEDURE:  XR CHEST AP PORTABLE  (CPT=71045)         TECHNIQUE:  AP chest radiograph was obtained.          COMPARISON:  DEVONTE GARCIA CHE her heart issues, she would like to maintain the quality of life. I will do believe this is by Dr. Bean Si discussed aortic valve replacement procedure with her. I spoke to Dr. Linda Mills who will consult on the patient as well as the [facility].

## 2018-08-17 NOTE — H&P
[facility]  History and Physical     Ulices Lorenzo Patient Status:  Emergency    10/1/1924 MRN [id number]   Location [address] [facility] Attending Chas Stoddard MD   Hosp Day # 0 [facility]     Chief Complaint: vomiti she quit smoking about 50 years ago. She smoked 0.00 packs per day. She has never used smokeless tobacco. She reports that she does not drink alcohol or use drugs.     Family History:   Family History   Problem Relation Age of Onset   • Heart Disorder Fathe mg daily. Take 1/2 tablet daily. When having Lupus flare, take 2 tablets daily for four days. ) Disp: 90 tablet Rfl: 0       Review of Systems:   A comprehensive 14 point review of systems was completed.     Pertinent positives and negatives noted in the HP consult to consider tavr (had already been discussed as OP)  4. ALEJO-due to dehydration and hypotension  5. Hypoxia-unclear if CHF 2/2 aortic stenosis or if from aspiration pneumonia-no diuresis due to volume loss from vomiting and hypotension  6.  Possible

## 2018-08-18 LAB
ANION GAP SERPL CALC-SCNC: 7 MMOL/L (ref 0–18)
ATRIAL RATE: 90 BPM
BAND %: 6 %
BASOPHIL % MANUAL: 0 %
BASOPHIL ABSOLUTE MANUAL: 0 X10(3) UL (ref 0–0.1)
BILIRUB UR QL STRIP.AUTO: NEGATIVE
BUN BLD-MCNC: 38 MG/DL (ref 8–20)
BUN/CREAT SERPL: 57.6 (ref 10–20)
CALCIUM BLD-MCNC: 8 MG/DL (ref 8.3–10.3)
CHLORIDE SERPL-SCNC: 106 MMOL/L (ref 101–111)
CO2 SERPL-SCNC: 26 MMOL/L (ref 22–32)
COLOR UR AUTO: YELLOW
CREAT BLD-MCNC: 0.66 MG/DL (ref 0.55–1.02)
EOSINOPHIL % MANUAL: 0 %
EOSINOPHIL ABSOLUTE MANUAL: 0 X10(3) UL (ref 0–0.3)
ERYTHROCYTE [DISTWIDTH] IN BLOOD BY AUTOMATED COUNT: 13.7 % (ref 11.5–16)
GLUCOSE BLD-MCNC: 79 MG/DL (ref 70–99)
GLUCOSE UR STRIP.AUTO-MCNC: NEGATIVE MG/DL
HAV IGM SER QL: 2.5 MG/DL (ref 1.8–2.5)
HCT VFR BLD AUTO: 31.8 % (ref 34–50)
HGB BLD-MCNC: 10.4 G/DL (ref 12–16)
HYALINE CASTS #/AREA URNS AUTO: PRESENT /LPF
LYMPHOCYTE % MANUAL: 2 %
LYMPHOCYTE ABSOLUTE MANUAL: 0.19 X10(3) UL (ref 0.9–4)
MCH RBC QN AUTO: 31.9 PG (ref 27–33.2)
MCHC RBC AUTO-ENTMCNC: 32.7 G/DL (ref 31–37)
MCV RBC AUTO: 97.5 FL (ref 81–100)
MONOCYTE % MANUAL: 7 %
MONOCYTE ABSOLUTE MANUAL: 0.67 X10(3) UL (ref 0.1–1)
MORPHOLOGY: NORMAL
NEUTROPHIL ABS PRELIM: 8.39 X10 (3) UL (ref 1.3–6.7)
NEUTROPHIL ABSOLUTE MANUAL: 8.74 X10(3) UL (ref 1.3–6.7)
NEUTROPHILS % MANUAL: 85 %
NITRITE UR QL STRIP.AUTO: NEGATIVE
OSMOLALITY SERPL CALC.SUM OF ELEC: 296 MOSM/KG (ref 275–295)
P AXIS: 40 DEGREES
P-R INTERVAL: 222 MS
PH UR STRIP.AUTO: 5 [PH] (ref 4.5–8)
PLATELET # BLD AUTO: 162 10(3)UL (ref 150–450)
PLATELET MORPHOLOGY: NORMAL
POTASSIUM SERPL-SCNC: 3.7 MMOL/L (ref 3.6–5.1)
PROCALCITONIN SERPL-MCNC: 5.69 NG/ML
PROT UR STRIP.AUTO-MCNC: NEGATIVE MG/DL
Q-T INTERVAL: 390 MS
QRS DURATION: 108 MS
QTC CALCULATION (BEZET): 477 MS
R AXIS: -31 DEGREES
RBC # BLD AUTO: 3.26 X10(6)UL (ref 3.8–5.1)
RBC UR QL AUTO: NEGATIVE
RED CELL DISTRIBUTION WIDTH-SD: 49.8 FL (ref 35.1–46.3)
SODIUM SERPL-SCNC: 139 MMOL/L (ref 136–144)
SP GR UR STRIP.AUTO: 1.02 (ref 1–1.03)
T AXIS: 17 DEGREES
TOTAL CELLS COUNTED: 100
UROBILINOGEN UR STRIP.AUTO-MCNC: 2 MG/DL
VENTRICULAR RATE: 90 BPM
WBC # BLD AUTO: 9.6 X10(3) UL (ref 4–13)

## 2018-08-18 PROCEDURE — 99233 SBSQ HOSP IP/OBS HIGH 50: CPT | Performed by: INTERNAL MEDICINE

## 2018-08-18 RX ORDER — DIPHENHYDRAMINE HCL 25 MG
25 CAPSULE ORAL EVERY 4 HOURS PRN
Status: DISCONTINUED | OUTPATIENT
Start: 2018-08-18 | End: 2018-08-22

## 2018-08-18 RX ORDER — PREDNISONE 1 MG/1
5 TABLET ORAL DAILY
Status: DISCONTINUED | OUTPATIENT
Start: 2018-08-18 | End: 2018-08-22

## 2018-08-18 RX ORDER — POTASSIUM CHLORIDE 20 MEQ/1
40 TABLET, EXTENDED RELEASE ORAL ONCE
Status: COMPLETED | OUTPATIENT
Start: 2018-08-18 | End: 2018-08-18

## 2018-08-18 RX ORDER — DIPHENHYDRAMINE HYDROCHLORIDE 50 MG/ML
12.5 INJECTION INTRAMUSCULAR; INTRAVENOUS EVERY 4 HOURS PRN
Status: DISCONTINUED | OUTPATIENT
Start: 2018-08-18 | End: 2018-08-22

## 2018-08-18 NOTE — PROGRESS NOTES
[facility] Pharmacy Note:  Renal Dose Adjustment for Metoclopramide (REGLAN)    Dylan Andrea has been prescribed Metoclopramide (REGLAN) 10 mg every 8 hours as needed for nausea    CrCl cannot be calculated (Unknown ideal weight.).     Her calculated creatini

## 2018-08-18 NOTE — SLP NOTE
Attempted to see pt for SLP evaluation. Pt with c/o nausea today, and refusing po at this time per report. SLP to re-attempt 8/19/18.    Minda Kauffman M.S., CCC-SLP/L

## 2018-08-18 NOTE — CONSULTS
[facility] Cardiology Consultation    Sue Key Patient Status:  Inpatient    10/1/1924 MRN [id number]   [facility] 2NE-A Attending Manish Godfrey MD   Hosp Day # 0 [facility]     Reason for Consultation:  Aortic stenosis, hypoxia, h History:  3/11/14: ANGIOPLASTY (CORONARY)      Comment: 80-90% RCAp s/p 4.0 x 23 mm Vision, with stent  : HYSTERECTOMY  Family History   Problem Relation Age of Onset   • Heart Disorder Father 59      age 59 heart dz   • Hypertension Mother      [facility] 8/17/2018:  reviewed    CXR, 8/17/2018:  Reviewed.     Labs:   HEM:  Recent Labs   Lab  08/17/18   1633   WBC  12.2   HGB  11.1*   PLT  180.0       Chem:  Recent Labs   Lab  08/17/18   1633   NA  139   K  3.9   CL  104   CO2  26.0   BUN  36*   CREATSERUM  1

## 2018-08-18 NOTE — PLAN OF CARE
CARDIOVASCULAR - ADULT    • Maintains optimal cardiac output and hemodynamic stability Progressing    • Absence of cardiac arrhythmias or at baseline Progressing        Cardiac monitor shows. ...SR with PVC's.   Alert and o x 3 , on O2 at 2 li/min via nc ; S

## 2018-08-18 NOTE — PLAN OF CARE
Patient complains of nausea.  Dr. Cotto Pert here to see patient, no new orders noted, Zofran given for nausea, no emesis at this time

## 2018-08-18 NOTE — PROGRESS NOTES
[facility] Cardiology Progress Note        Robert Medina Patient Status:  Inpatient    10/1/1924 MRN [id number]   [facility] 2NE-A Attending Emmanuelle Hogan MD   Hosp Day # 1 PCP [facility]     Subjective:  Yoon Wilkerson 08/18/18   0621   NA  139  139   K  3.9  3.7   CL  104  106   CO2  26.0  26.0   BUN  36*  38*   CREATSERUM  1.00  0.66   CA  7.9*  8.0*   MG   --   2.5   GLU  95  79       Recent Labs   Lab  08/17/18   1633   ALT  15   AST  15   ALB  2.5*       Recent Labs

## 2018-08-19 LAB
ANION GAP SERPL CALC-SCNC: 7 MMOL/L (ref 0–18)
BASOPHILS # BLD AUTO: 0 X10(3) UL (ref 0–0.1)
BASOPHILS NFR BLD AUTO: 0 %
BUN BLD-MCNC: 24 MG/DL (ref 8–20)
BUN/CREAT SERPL: 38.1 (ref 10–20)
CALCIUM BLD-MCNC: 8.1 MG/DL (ref 8.3–10.3)
CHLORIDE SERPL-SCNC: 103 MMOL/L (ref 101–111)
CO2 SERPL-SCNC: 27 MMOL/L (ref 22–32)
CREAT BLD-MCNC: 0.63 MG/DL (ref 0.55–1.02)
EOSINOPHIL # BLD AUTO: 0.05 X10(3) UL (ref 0–0.3)
EOSINOPHIL NFR BLD AUTO: 0.7 %
ERYTHROCYTE [DISTWIDTH] IN BLOOD BY AUTOMATED COUNT: 13.7 % (ref 11.5–16)
GLUCOSE BLD-MCNC: 80 MG/DL (ref 70–99)
HCT VFR BLD AUTO: 31.5 % (ref 34–50)
HGB BLD-MCNC: 10.2 G/DL (ref 12–16)
IMMATURE GRANULOCYTE COUNT: 0.03 X10(3) UL (ref 0–1)
IMMATURE GRANULOCYTE RATIO %: 0.4 %
LYMPHOCYTES # BLD AUTO: 0.43 X10(3) UL (ref 0.9–4)
LYMPHOCYTES NFR BLD AUTO: 5.7 %
MCH RBC QN AUTO: 31.7 PG (ref 27–33.2)
MCHC RBC AUTO-ENTMCNC: 32.4 G/DL (ref 31–37)
MCV RBC AUTO: 97.8 FL (ref 81–100)
MONOCYTES # BLD AUTO: 0.56 X10(3) UL (ref 0.1–1)
MONOCYTES NFR BLD AUTO: 7.4 %
NEUTROPHIL ABS PRELIM: 6.53 X10 (3) UL (ref 1.3–6.7)
NEUTROPHILS # BLD AUTO: 6.53 X10(3) UL (ref 1.3–6.7)
NEUTROPHILS NFR BLD AUTO: 85.8 %
OSMOLALITY SERPL CALC.SUM OF ELEC: 287 MOSM/KG (ref 275–295)
PLATELET # BLD AUTO: 166 10(3)UL (ref 150–450)
POTASSIUM SERPL-SCNC: 3.8 MMOL/L (ref 3.6–5.1)
POTASSIUM SERPL-SCNC: 3.8 MMOL/L (ref 3.6–5.1)
RBC # BLD AUTO: 3.22 X10(6)UL (ref 3.8–5.1)
RED CELL DISTRIBUTION WIDTH-SD: 49.9 FL (ref 35.1–46.3)
SODIUM SERPL-SCNC: 137 MMOL/L (ref 136–144)
WBC # BLD AUTO: 7.6 X10(3) UL (ref 4–13)

## 2018-08-19 PROCEDURE — 99232 SBSQ HOSP IP/OBS MODERATE 35: CPT | Performed by: INTERNAL MEDICINE

## 2018-08-19 RX ORDER — FUROSEMIDE 40 MG/1
40 TABLET ORAL DAILY
Status: DISCONTINUED | OUTPATIENT
Start: 2018-08-19 | End: 2018-08-22

## 2018-08-19 RX ORDER — POTASSIUM CHLORIDE 20 MEQ/1
40 TABLET, EXTENDED RELEASE ORAL ONCE
Status: COMPLETED | OUTPATIENT
Start: 2018-08-19 | End: 2018-08-19

## 2018-08-19 NOTE — PROGRESS NOTES
[facility] Cardiology Progress Note        Klaudia Ramirez Patient Status:  Inpatient    10/1/1924 MRN [id number]   [facility] 2NE-A Attending Maryanne Degroot MD   Hosp Day # 2 PCP [facility]     Subjective:  No f 162.0  166.0       Chem:  Recent Labs   Lab  08/17/18   1633  08/18/18   0621  08/19/18   0608   NA  139  139  137   K  3.9  3.7  3.8  3.8   CL  104  106  103   CO2  26.0  26.0  27.0   BUN  36*  38*  24*   CREATSERUM  1.00  0.66  0.63   CA  7.9*  8.0*  8.

## 2018-08-19 NOTE — PLAN OF CARE
CARDIOVASCULAR - ADULT    • Maintains optimal cardiac output and hemodynamic stability Progressing    • Absence of cardiac arrhythmias or at baseline Progressing        Impaired Swallowing    • Minimize aspiration risk Progressing          Up in chair,[place]ak

## 2018-08-19 NOTE — PLAN OF CARE
Problem: Impaired Swallowing  Goal: Minimize aspiration risk  Interventions:   - Nectar thick liquids BY SPOON, puree solids  - Patient should be alert and upright for all feedings (90 degrees preferred)  - Offer food and liquids at a slow rate  - No straw

## 2018-08-19 NOTE — SLP NOTE
ADULT SWALLOWING EVALUATION    ASSESSMENT    ASSESSMENT/OVERALL IMPRESSION:  Bedside swallow evaluation completed. Per MD note: \"History of Present Illness: Fortino Roberts is a 80year old female with vomiting. Had vomiting today, x3, no blood in it. Compensatory Strategies Recommended: Liquids via spoon; No straws; Slow rate;Small bites and sips  Aspiration Precautions: Upright position; Slow rate;Small bites and sips; No straw  Medication Administration Recommendations: Whole in puree;Crushed in pure to Admission: Regular; Thin liquids       Patient/Family Goals: To eat and drink    SWALLOWING HISTORY  Current Diet Consistency: Regular; Thin liquids (prior to this evaluation)  Dysphagia History: None pta  Imaging Results: CXR: \"CONCLUSION:  Bilateral ai Meet Established Goals: 2  Follow Up Needed: Yes  SLP Follow-up Date: 08/20/18    Thank you for your referral.   If you have any questions, please contact Lory Neville

## 2018-08-19 NOTE — PROGRESS NOTES
[facility]  Progress Note     Mickiel Spatz Patient Status:  Inpatient    10/1/1924 MRN [id number]   [facility] 2NE-A Attending Alfonso Fernandez MD   Hosp Day # 2 PCP [facility]     Chief Complaint: vomiting, hypoxia    S: Layla Aleman Daily   • predniSONE  5 mg Oral Daily   • Pramipexole Dihydrochloride  0.5 mg Oral BID   • mirtazapine  7.5 mg Oral Nightly   • magnesium  250 mg Oral Daily   • Levothyroxine Sodium  75 mcg Oral Before breakfast   • ferrous sulfate  325 mg Oral BID with me

## 2018-08-19 NOTE — PROGRESS NOTES
Pt. right IV site got infiltrated from Zithromax IV ;  C/o itching , reddened and swollen. Applied cold compress. Notified MD with order for Benadryl IV/PO , noted and carried out. Will cont. Monitor. 0500  Pt.  Right hand better ; denies an

## 2018-08-20 ENCOUNTER — APPOINTMENT (OUTPATIENT)
Dept: GENERAL RADIOLOGY | Facility: HOSPITAL | Age: 83
DRG: 178 | End: 2018-08-20
Attending: INTERNAL MEDICINE
Payer: MEDICARE

## 2018-08-20 LAB
POTASSIUM SERPL-SCNC: 3.5 MMOL/L (ref 3.6–5.1)
POTASSIUM SERPL-SCNC: 4.5 MMOL/L (ref 3.6–5.1)

## 2018-08-20 PROCEDURE — 99232 SBSQ HOSP IP/OBS MODERATE 35: CPT | Performed by: INTERNAL MEDICINE

## 2018-08-20 PROCEDURE — 74230 X-RAY XM SWLNG FUNCJ C+: CPT | Performed by: INTERNAL MEDICINE

## 2018-08-20 RX ORDER — POTASSIUM CHLORIDE 20 MEQ/1
40 TABLET, EXTENDED RELEASE ORAL EVERY 4 HOURS
Status: COMPLETED | OUTPATIENT
Start: 2018-08-20 | End: 2018-08-20

## 2018-08-20 NOTE — PLAN OF CARE
CARDIOVASCULAR - ADULT    • Maintains optimal cardiac output and hemodynamic stability Progressing    • Absence of cardiac arrhythmias or at baseline Progressing        Impaired Swallowing    • Minimize aspiration risk Progressing        Cardiac monitor sh

## 2018-08-20 NOTE — PROGRESS NOTES
[facility]  Cardiology Progress Note    Felix Crumble Patient Status:  Inpatient    10/1/1924 MRN [id number]   [facility] 2NE-A Attending Dane Seaman MD   Hosp Day # 3 PCP [facility]     Assessment:  Severe calcific AS-pt un magnesium  250 mg Oral Daily   • Levothyroxine Sodium  75 mcg Oral Before breakfast   • ferrous sulfate  325 mg Oral BID with meals   • aspirin  81 mg Oral Daily   • Heparin Sodium (Porcine)  5,000 Units Subcutaneous 2 times per day   • piperacillin-tazoba

## 2018-08-20 NOTE — PROGRESS NOTES
08/20/18 1404   Clinical Encounter Type   Referral To ( filed completed POLST form into patient's electronic medical record. )

## 2018-08-20 NOTE — SLP NOTE
ADULT VIDEOFLUOROSCOPIC SWALLOWING STUDY    Admission Date: 8/17/2018  Evaluation Date: 08/20/18  Radiologist: Dr. Velarde Bowels: Regular  Diet Recommendations - Liquid: Thin (small sips.   No straws)    Further arthritis(714.0)    • Systemic lupus erythematosus ([facility] [address] 12/9/2012   • Urinary tract infection due to ESBL Klebsiella    • Visual impairment        Current Diet Consistency: Regular; Thin liquids (prior to this evaluation)  Prior Level of Function: Assistan Aspiration: None  Strategy(ies) Implemented (Nectar Thick):  (small sips)  Effectiveness: Yes     PUREE  Oral Phase of Swallow (VFSS - Puree):  Within Functional Limits  Triggered at: Base of tongue  Delay (seconds):  (no delay)  Residue Severity, Location: foods) consistency and thin liquids without overt signs or symptoms of aspiration with 95 % accuracy over 1-2 session(s).   New goal   Goal #2 The patient/family/caregiver will demonstrate understanding and implementation of aspiration precautions and swall

## 2018-08-20 NOTE — PLAN OF CARE
Problem: Impaired Swallowing  Goal: Minimize aspiration risk  Interventions:   - Reg/thin. Small sips.   Downgrade to nectar if csa  - Patient should be alert and upright for all feedings (90 degrees preferred)  - Offer food and liquids at a slow rate  - N

## 2018-08-20 NOTE — CM/SW NOTE
08/20/18 1400   CM/SW Referral Data   Referral Source Social Work (self-referral)   Reason for Referral Discharge planning   Informant Patient   Patient Info   Patient's Mental Status Alert;Oriented   Patient's [address]

## 2018-08-20 NOTE — PROGRESS NOTES
[facility]  Progress Note     Fariha Mashonna Patient Status:  Inpatient    10/1/1924 MRN [id number]   [facility] 2NE-A Attending Rivas Melendez MD   Hosp Day # 3 PCP [facility]     Chief Complaint: vomiting, hypoxia    S: Erich Acosta hours. Recent Labs   Lab  08/17/18   1633   TROP  0.182*            Imaging: Imaging data reviewed in Epic.     Medications:   • Potassium Chloride ER  40 mEq Oral Q4H   • furosemide  40 mg Oral Daily   • predniSONE  5 mg Oral Daily   • Pramipexole Dihyd

## 2018-08-20 NOTE — PLAN OF CARE
CARDIOVASCULAR - ADULT    • Maintains optimal cardiac output and hemodynamic stability Progressing    • Absence of cardiac arrhythmias or at baseline Progressing        Impaired Swallowing    • Minimize aspiration risk Progressing          Assumed care 073

## 2018-08-21 PROCEDURE — 99232 SBSQ HOSP IP/OBS MODERATE 35: CPT | Performed by: INTERNAL MEDICINE

## 2018-08-21 RX ORDER — METOPROLOL SUCCINATE 25 MG/1
25 TABLET, EXTENDED RELEASE ORAL
Status: DISCONTINUED | OUTPATIENT
Start: 2018-08-21 | End: 2018-08-22

## 2018-08-21 RX ORDER — METOPROLOL SUCCINATE 25 MG/1
25 TABLET, EXTENDED RELEASE ORAL
Qty: 30 TABLET | Refills: 1 | Status: ON HOLD | OUTPATIENT
Start: 2018-08-21 | End: 2019-01-01

## 2018-08-21 RX ORDER — FUROSEMIDE 40 MG/1
40 TABLET ORAL EVERY MORNING
COMMUNITY
End: 2019-01-01

## 2018-08-21 RX ORDER — AMOXICILLIN AND CLAVULANATE POTASSIUM 875; 125 MG/1; MG/1
1 TABLET, FILM COATED ORAL 2 TIMES DAILY
Qty: 12 TABLET | Refills: 0 | Status: SHIPPED | OUTPATIENT
Start: 2018-08-21 | End: 2018-08-27

## 2018-08-21 RX ORDER — AMOXICILLIN AND CLAVULANATE POTASSIUM 500; 125 MG/1; MG/1
1 TABLET, FILM COATED ORAL 2 TIMES DAILY
Qty: 12 TABLET | Refills: 0 | Status: SHIPPED | OUTPATIENT
Start: 2018-08-21 | End: 2018-08-21

## 2018-08-21 NOTE — SLP NOTE
SPEECH DAILY NOTE - INPATIENT    ASSESSMENT & PLAN   ASSESSMENT  Pt seen for meal assess/dysphagia therapy to monitor po tolerance of recommended diet and ensure adherence to aspiration precautions. Pt alert and sitting upright in chair.   DON from [facility] 2    Session: B/S, VFSS and f/u    If you have any questions, please contact Josh Moreau.  Marcus Huitron M.S.,CCC-SLP  Speech Language Pathologist

## 2018-08-21 NOTE — PROGRESS NOTES
[facility]  Progress Note     Kimberli Latesha Patient Status:  Inpatient    10/1/1924 MRN [id number]   [facility] 2NE-A Attending Rodriguez Schafer MD   Hosp Day # 4 PCP [facility]     Chief Complaint: vomiting, hypoxia    S: Kaylee Killian last 168 hours. Recent Labs   Lab  08/17/18   1633   TROP  0.182*            Imaging: Imaging data reviewed in Epic.     Medications:   • Metoprolol Succinate ER  25 mg Oral Daily Beta Blocker   • furosemide  40 mg Oral Daily   • predniSONE  5 mg Oral Da

## 2018-08-21 NOTE — PLAN OF CARE
Problem: CARDIOVASCULAR - ADULT  Goal: Maintains optimal cardiac output and hemodynamic stability  INTERVENTIONS:  - Monitor vital signs, rhythm, and trends  - Monitor for bleeding, hypotension and signs of decreased cardiac output  - Evaluate effectivenes mobilization  - Obtain PT/OT consults as needed  - Advance activity as appropriate  - Communicate ordered activity level and limitations with patient/family  Outcome: Progressing

## 2018-08-21 NOTE — PLAN OF CARE
Assumed care for this patient at 0730: patient alert and oriented. Denies pain. Admitted with PNA. On IV zosyn and zithromax. Complaining of nausea this afternoon and not feeling well. Poor appetite. Vitals stable.  Will hold discharge for today per primary

## 2018-08-21 NOTE — PROGRESS NOTES
[facility]  Cardiology Progress Note    Rosamaria Tom Patient Status:  Inpatient    10/1/1924 MRN [id number]   [facility] 2NE-A Attending Gigi Gamboa MD   Hosp Day # 4 PCP [facility]     Assessment:  Severe calcific AS-pt un Warm and dry, no obvious rashes     MEDICATIONS:  • furosemide  40 mg Oral Daily   • predniSONE  5 mg Oral Daily   • Pramipexole Dihydrochloride  0.5 mg Oral BID   • mirtazapine  7.5 mg Oral Nightly   • magnesium  250 mg Oral Daily   • Levothyroxine Sodium

## 2018-08-21 NOTE — PLAN OF CARE
Patient saturations at rest on RA is 91%. When up and walking desats to 88%,complains of shortness of breath. placed on 3L to get to 91%.

## 2018-08-21 NOTE — PLAN OF CARE
CARDIOVASCULAR - ADULT    • Maintains optimal cardiac output and hemodynamic stability Progressing    • Absence of cardiac arrhythmias or at baseline Progressing        Impaired Swallowing    • Minimize aspiration risk Progressing        MUSCULOSKELETAL -

## 2018-08-21 NOTE — CM/SW NOTE
Received call from Juan Jose, patient is current with PT and ST, in need of resumption orders and d/c summary, will fax when available.     [id number] spoke with patient and dtr at bedside, dtr to drive patient back to [facility]

## 2018-08-22 ENCOUNTER — APPOINTMENT (OUTPATIENT)
Dept: GENERAL RADIOLOGY | Facility: HOSPITAL | Age: 83
DRG: 178 | End: 2018-08-22
Attending: STUDENT IN AN ORGANIZED HEALTH CARE EDUCATION/TRAINING PROGRAM
Payer: MEDICARE

## 2018-08-22 VITALS
TEMPERATURE: 98 F | RESPIRATION RATE: 18 BRPM | WEIGHT: 100.06 LBS | HEART RATE: 73 BPM | SYSTOLIC BLOOD PRESSURE: 111 MMHG | BODY MASS INDEX: 20 KG/M2 | OXYGEN SATURATION: 89 % | DIASTOLIC BLOOD PRESSURE: 61 MMHG

## 2018-08-22 LAB
ANION GAP SERPL CALC-SCNC: 5 MMOL/L (ref 0–18)
BUN BLD-MCNC: 15 MG/DL (ref 8–20)
BUN/CREAT SERPL: 18.8 (ref 10–20)
CALCIUM BLD-MCNC: 9.3 MG/DL (ref 8.3–10.3)
CHLORIDE SERPL-SCNC: 101 MMOL/L (ref 101–111)
CO2 SERPL-SCNC: 32 MMOL/L (ref 22–32)
CREAT BLD-MCNC: 0.8 MG/DL (ref 0.55–1.02)
GLUCOSE BLD-MCNC: 91 MG/DL (ref 70–99)
OSMOLALITY SERPL CALC.SUM OF ELEC: 286 MOSM/KG (ref 275–295)
POTASSIUM SERPL-SCNC: 3.3 MMOL/L (ref 3.6–5.1)
SODIUM SERPL-SCNC: 138 MMOL/L (ref 136–144)

## 2018-08-22 PROCEDURE — 99239 HOSP IP/OBS DSCHRG MGMT >30: CPT | Performed by: STUDENT IN AN ORGANIZED HEALTH CARE EDUCATION/TRAINING PROGRAM

## 2018-08-22 NOTE — DISCHARGE SUMMARY
[facility]ITAL[facility]  DISCHARGE SUMMARY     Johanna Bonilla Patient Status:  Inpatient    10/1/1924 MRN [id number]   [facility] 2NE-A Attending Fiona Albarran MD   Hosp Day # 5 [facility]     Date of Admission: 2018  Date of Disch and recommendations (brief descriptions):  • no    Lab/Test results pending at Discharge:   · no    Consultants:  • Cardiology    Discharge Medication List:     Discharge Medications      START taking these medications      Instructions Prescription detail Take 250 mg by mouth daily. Refills:  0     MIRAPEX 0.5 MG Tabs  Generic drug:  Pramipexole Dihydrochloride      Take 0.5 mg by mouth 2 (two) times daily.    Refills:  0     mirtazapine 7.5 MG Tabs  Commonly known as:  REMERON      Take 7.5 mg by mouth ni

## 2018-08-22 NOTE — PROGRESS NOTES
[facility]  Cardiology Progress Note    Fortino Roberts Patient Status:  Inpatient    10/1/1924 MRN [id number]   [facility] 2NE-A Attending Catalina Lake MD   Hosp Day # 5 PCP [facility]     Assessment:  Severe calcific AS-pt unsur Metoprolol Succinate ER  25 mg Oral Daily Beta Blocker   • furosemide  40 mg Oral Daily   • predniSONE  5 mg Oral Daily   • Pramipexole Dihydrochloride  0.5 mg Oral BID   • mirtazapine  7.5 mg Oral Nightly   • magnesium  250 mg Oral Daily   • Levothyroxine

## 2018-08-22 NOTE — PLAN OF CARE
High fall risk precautions maintained  High risk for skin breakdown precautions maintained  High risk for bleeding precautions maintained    Comments: Pt is A&OX4, VSS on RA w/, and maintaining NSR on telemetry.   Awaiting daughter to arrive to take her cardiac monitoring, monitor vital signs, obtain 12 lead EKG if indicated  - Evaluate effectiveness of antiarrhythmic and heart rate control medications as ordered  - Initiate emergency measures for life threatening arrhythmias  - Monitor electrolytes and a

## 2018-08-22 NOTE — PROGRESS NOTES
Oxygen Saturation Study:       08/22/18 0830   Mobility   Activity Ambulate in flores   Level of Assistance Standby assist, set-up cues, supervision of patient - no hands on   Assistive Device Front wheel walker   Distance (ft) 250   Ambulation Response Giulia

## 2018-08-22 NOTE — CM/SW NOTE
08/22/18 1455   Discharge disposition   Expected discharge disposition Home-Health   Name of Facillity/Home Care/Hospice [facility]  (w/ home health)   Discharge transportation Private car     Avs, d/c summary and home health orders faxed to [facility] DO

## 2018-09-06 ENCOUNTER — NURSE ONLY (OUTPATIENT)
Dept: LAB | Age: 83
End: 2018-09-06
Attending: FAMILY MEDICINE
Payer: MEDICARE

## 2018-09-06 DIAGNOSIS — M32.9 LUPUS (HCC): ICD-10-CM

## 2018-09-06 DIAGNOSIS — E03.9 HYPOTHYROIDISM, ADULT: Primary | ICD-10-CM

## 2018-09-06 DIAGNOSIS — G25.81 RLS (RESTLESS LEGS SYNDROME): ICD-10-CM

## 2018-09-06 DIAGNOSIS — M25.561 RIGHT KNEE PAIN: ICD-10-CM

## 2018-09-06 DIAGNOSIS — F32.A DEPRESSION: ICD-10-CM

## 2018-09-06 LAB
ANION GAP SERPL CALC-SCNC: 9 MMOL/L (ref 0–18)
BUN BLD-MCNC: 17 MG/DL (ref 8–20)
BUN/CREAT SERPL: 31.5 (ref 10–20)
CALCIUM BLD-MCNC: 8.6 MG/DL (ref 8.3–10.3)
CHLORIDE SERPL-SCNC: 106 MMOL/L (ref 101–111)
CO2 SERPL-SCNC: 25 MMOL/L (ref 22–32)
CREAT BLD-MCNC: 0.54 MG/DL (ref 0.55–1.02)
GLUCOSE BLD-MCNC: 91 MG/DL (ref 70–99)
OSMOLALITY SERPL CALC.SUM OF ELEC: 291 MOSM/KG (ref 275–295)
POTASSIUM SERPL-SCNC: 3.8 MMOL/L (ref 3.6–5.1)
SODIUM SERPL-SCNC: 140 MMOL/L (ref 136–144)

## 2018-09-06 PROCEDURE — 36415 COLL VENOUS BLD VENIPUNCTURE: CPT

## 2018-09-06 PROCEDURE — 80048 BASIC METABOLIC PNL TOTAL CA: CPT

## 2018-09-27 ENCOUNTER — HOSPITAL ENCOUNTER (EMERGENCY)
Facility: HOSPITAL | Age: 83
Discharge: HOME | End: 2018-09-27
Attending: EMERGENCY MEDICINE
Payer: MEDICARE

## 2018-09-27 VITALS
BODY MASS INDEX: 20 KG/M2 | DIASTOLIC BLOOD PRESSURE: 60 MMHG | OXYGEN SATURATION: 95 % | HEIGHT: 59 IN | WEIGHT: 99.19 LBS | TEMPERATURE: 98 F | HEART RATE: 72 BPM | SYSTOLIC BLOOD PRESSURE: 113 MMHG | RESPIRATION RATE: 18 BRPM

## 2018-09-27 DIAGNOSIS — R19.7 DIARRHEA, UNSPECIFIED TYPE: Primary | ICD-10-CM

## 2018-09-27 PROCEDURE — 80053 COMPREHEN METABOLIC PANEL: CPT | Performed by: EMERGENCY MEDICINE

## 2018-09-27 PROCEDURE — 85025 COMPLETE CBC W/AUTO DIFF WBC: CPT | Performed by: EMERGENCY MEDICINE

## 2018-09-27 PROCEDURE — 83690 ASSAY OF LIPASE: CPT | Performed by: EMERGENCY MEDICINE

## 2018-09-27 PROCEDURE — 99284 EMERGENCY DEPT VISIT MOD MDM: CPT

## 2018-09-27 PROCEDURE — 96360 HYDRATION IV INFUSION INIT: CPT

## 2018-09-27 PROCEDURE — 96361 HYDRATE IV INFUSION ADD-ON: CPT

## 2018-09-27 RX ORDER — SODIUM CHLORIDE 9 MG/ML
INJECTION, SOLUTION INTRAVENOUS ONCE
Status: DISCONTINUED | OUTPATIENT
Start: 2018-09-27 | End: 2018-09-27

## 2018-09-27 RX ORDER — LOPERAMIDE HYDROCHLORIDE 2 MG/1
2 TABLET ORAL AS NEEDED
Qty: 20 TABLET | Refills: 0 | Status: SHIPPED | OUTPATIENT
Start: 2018-09-27 | End: 2018-10-27

## 2018-09-27 RX ORDER — SODIUM CHLORIDE 9 MG/ML
INJECTION, SOLUTION INTRAVENOUS ONCE
Status: COMPLETED | OUTPATIENT
Start: 2018-09-27 | End: 2018-09-27

## 2018-09-27 NOTE — ED PROVIDER NOTES
Patient Seen in: [facility] Emergency Department    History   Patient presents with:  Nausea/Vomiting/Diarrhea (gastrointestinal)    Stated Complaint: NVD    HPI    Patient is a 27-year-old female with extensive medical history including coronary di Klebsiella    • Visual impairment        Past Surgical History:  3/11/14: ANGIOPLASTY (CORONARY)      Comment:  80-90% RCAp s/p 4.0 x 23 mm Vision, with stent   1977: HYSTERECTOMY        Social History    Tobacco Use      Smoking status: Former Smoker Abnormal; Notable for the following components:    Lipase 65 (*)     All other components within normal limits   CBC W/ DIFFERENTIAL - Abnormal; Notable for the following components:    RBC 3.72 (*)     RDW-SD 51.6 (*)     Lymphocyte Absolute 0.55 (*) [id number]  [phone]    In 3 days  As needed        Medications Prescribed:  Current Discharge Medication List    START taking these medications    Loperamide HCl 2 MG Oral Tab  Take 1 tablet (2 mg total) by mouth as needed for Diarrhea.   Qty: 20 tablet Ref

## 2018-09-27 NOTE — ED NOTES
Pt ambulated by PCT with very little assistance. Per family, pt performed normally and does not seem weak.

## 2018-09-27 NOTE — ED INITIAL ASSESSMENT (HPI)
Pt from [facility] states diarrhea since Sunday night, pt states 10 times on Sunday,6 times a day since, no abd pain or vomiting

## 2018-10-26 ENCOUNTER — NURSE ONLY (OUTPATIENT)
Dept: LAB | Age: 83
End: 2018-10-26
Attending: FAMILY MEDICINE
Payer: MEDICARE

## 2018-10-26 DIAGNOSIS — G25.81 RLS (RESTLESS LEGS SYNDROME): ICD-10-CM

## 2018-10-26 DIAGNOSIS — M32.9 LUPUS (HCC): ICD-10-CM

## 2018-10-26 DIAGNOSIS — F32.A DEPRESSION: ICD-10-CM

## 2018-10-26 DIAGNOSIS — E03.9 HYPOTHYROIDISM: Primary | ICD-10-CM

## 2018-10-26 PROCEDURE — 36415 COLL VENOUS BLD VENIPUNCTURE: CPT

## 2018-10-26 PROCEDURE — 80053 COMPREHEN METABOLIC PANEL: CPT

## 2018-12-07 ENCOUNTER — NURSE ONLY (OUTPATIENT)
Dept: LAB | Age: 83
End: 2018-12-07
Attending: FAMILY MEDICINE
Payer: MEDICARE

## 2018-12-07 DIAGNOSIS — E03.9 HYPOTHYROIDISM: Primary | ICD-10-CM

## 2018-12-07 DIAGNOSIS — M25.561 RIGHT KNEE PAIN: ICD-10-CM

## 2018-12-07 DIAGNOSIS — G25.81 RLS (RESTLESS LEGS SYNDROME): ICD-10-CM

## 2018-12-07 DIAGNOSIS — F32.A DEPRESSION: ICD-10-CM

## 2018-12-07 PROCEDURE — 85025 COMPLETE CBC W/AUTO DIFF WBC: CPT

## 2018-12-07 PROCEDURE — 36415 COLL VENOUS BLD VENIPUNCTURE: CPT

## 2018-12-07 PROCEDURE — 80053 COMPREHEN METABOLIC PANEL: CPT

## 2019-01-01 ENCOUNTER — NURSE ONLY (OUTPATIENT)
Dept: LAB | Age: 84
End: 2019-01-01
Attending: FAMILY MEDICINE
Payer: MEDICARE

## 2019-01-01 ENCOUNTER — HOSPITAL ENCOUNTER (INPATIENT)
Facility: HOSPITAL | Age: 84
LOS: 2 days | Discharge: HOME HEALTH | DRG: 178 | End: 2019-01-01
Attending: EMERGENCY MEDICINE | Admitting: HOSPITALIST
Payer: MEDICARE

## 2019-01-01 ENCOUNTER — APPOINTMENT (OUTPATIENT)
Dept: GENERAL RADIOLOGY | Facility: HOSPITAL | Age: 84
DRG: 178 | End: 2019-01-01
Attending: EMERGENCY MEDICINE
Payer: MEDICARE

## 2019-01-01 ENCOUNTER — APPOINTMENT (OUTPATIENT)
Dept: CT IMAGING | Facility: HOSPITAL | Age: 84
DRG: 178 | End: 2019-01-01
Attending: EMERGENCY MEDICINE
Payer: MEDICARE

## 2019-01-01 VITALS
HEART RATE: 125 BPM | RESPIRATION RATE: 16 BRPM | BODY MASS INDEX: 20.35 KG/M2 | WEIGHT: 103.63 LBS | TEMPERATURE: 98 F | SYSTOLIC BLOOD PRESSURE: 120 MMHG | OXYGEN SATURATION: 94 % | DIASTOLIC BLOOD PRESSURE: 60 MMHG | HEIGHT: 60 IN

## 2019-01-01 DIAGNOSIS — E03.9 HYPOTHYROIDISM: Primary | ICD-10-CM

## 2019-01-01 DIAGNOSIS — M32.9 LUPUS (HCC): ICD-10-CM

## 2019-01-01 DIAGNOSIS — M25.569 KNEE PAIN: ICD-10-CM

## 2019-01-01 DIAGNOSIS — F32.A DEPRESSION: ICD-10-CM

## 2019-01-01 DIAGNOSIS — M25.561 KNEE PAIN, RIGHT: ICD-10-CM

## 2019-01-01 DIAGNOSIS — E03.9 HYPOTHYROIDISM: ICD-10-CM

## 2019-01-01 DIAGNOSIS — N30.00 ACUTE CYSTITIS WITHOUT HEMATURIA: ICD-10-CM

## 2019-01-01 DIAGNOSIS — R77.8 ELEVATED TROPONIN: ICD-10-CM

## 2019-01-01 DIAGNOSIS — Z00.00 ROUTINE GENERAL MEDICAL EXAMINATION AT A HEALTH CARE FACILITY: Primary | ICD-10-CM

## 2019-01-01 DIAGNOSIS — J18.9 COMMUNITY ACQUIRED PNEUMONIA, UNSPECIFIED LATERALITY: ICD-10-CM

## 2019-01-01 DIAGNOSIS — I95.9 HYPOTENSION, UNSPECIFIED HYPOTENSION TYPE: ICD-10-CM

## 2019-01-01 DIAGNOSIS — G25.81 RESTLESS LEG SYNDROME: ICD-10-CM

## 2019-01-01 DIAGNOSIS — K52.9 GASTROENTERITIS: Primary | ICD-10-CM

## 2019-01-01 DIAGNOSIS — F32.A DEPRESSION: Primary | ICD-10-CM

## 2019-01-01 DIAGNOSIS — I10 HTN (HYPERTENSION): Primary | ICD-10-CM

## 2019-01-01 DIAGNOSIS — M25.561 RIGHT KNEE PAIN: ICD-10-CM

## 2019-01-01 LAB
ALBUMIN SERPL-MCNC: 2.1 G/DL (ref 3.4–5)
ALBUMIN SERPL-MCNC: 2.3 G/DL (ref 3.4–5)
ALBUMIN SERPL-MCNC: 2.4 G/DL (ref 3.4–5)
ALBUMIN SERPL-MCNC: 2.5 G/DL (ref 3.4–5)
ALBUMIN/GLOB SERPL: 0.6 {RATIO} (ref 1–2)
ALBUMIN/GLOB SERPL: 0.7 {RATIO} (ref 1–2)
ALP LIVER SERPL-CCNC: 82 U/L (ref 55–142)
ALP LIVER SERPL-CCNC: 90 U/L (ref 55–142)
ALP LIVER SERPL-CCNC: 95 U/L (ref 55–142)
ALP LIVER SERPL-CCNC: 97 U/L (ref 55–142)
ALT SERPL-CCNC: 14 U/L (ref 13–56)
ALT SERPL-CCNC: 15 U/L (ref 13–56)
ALT SERPL-CCNC: 16 U/L (ref 13–56)
ALT SERPL-CCNC: 17 U/L (ref 13–56)
ANION GAP SERPL CALC-SCNC: 3 MMOL/L (ref 0–18)
ANION GAP SERPL CALC-SCNC: 3 MMOL/L (ref 0–18)
ANION GAP SERPL CALC-SCNC: 4 MMOL/L (ref 0–18)
ANION GAP SERPL CALC-SCNC: 4 MMOL/L (ref 0–18)
ANION GAP SERPL CALC-SCNC: 6 MMOL/L (ref 0–18)
AST SERPL-CCNC: 18 U/L (ref 15–37)
AST SERPL-CCNC: 19 U/L (ref 15–37)
AST SERPL-CCNC: 21 U/L (ref 15–37)
AST SERPL-CCNC: 21 U/L (ref 15–37)
BILIRUB SERPL-MCNC: 0.3 MG/DL (ref 0.1–2)
BILIRUB SERPL-MCNC: 0.4 MG/DL (ref 0.1–2)
BUN BLD-MCNC: 14 MG/DL (ref 7–18)
BUN BLD-MCNC: 16 MG/DL (ref 7–18)
BUN BLD-MCNC: 17 MG/DL (ref 7–18)
BUN BLD-MCNC: 19 MG/DL (ref 7–18)
BUN BLD-MCNC: 19 MG/DL (ref 7–18)
BUN/CREAT SERPL: 25.5 (ref 10–20)
BUN/CREAT SERPL: 28.6 (ref 10–20)
BUN/CREAT SERPL: 29.8 (ref 10–20)
BUN/CREAT SERPL: 31.7 (ref 10–20)
BUN/CREAT SERPL: 31.7 (ref 10–20)
CALCIUM BLD-MCNC: 7.8 MG/DL (ref 8.5–10.1)
CALCIUM BLD-MCNC: 8 MG/DL (ref 8.5–10.1)
CALCIUM BLD-MCNC: 8.2 MG/DL (ref 8.5–10.1)
CALCIUM BLD-MCNC: 8.3 MG/DL (ref 8.5–10.1)
CALCIUM BLD-MCNC: 8.3 MG/DL (ref 8.5–10.1)
CHLORIDE SERPL-SCNC: 105 MMOL/L (ref 98–112)
CHLORIDE SERPL-SCNC: 106 MMOL/L (ref 98–112)
CHLORIDE SERPL-SCNC: 106 MMOL/L (ref 98–112)
CHLORIDE SERPL-SCNC: 107 MMOL/L (ref 98–112)
CHLORIDE SERPL-SCNC: 109 MMOL/L (ref 98–112)
CO2 SERPL-SCNC: 28 MMOL/L (ref 21–32)
CO2 SERPL-SCNC: 29 MMOL/L (ref 21–32)
CO2 SERPL-SCNC: 29 MMOL/L (ref 21–32)
CO2 SERPL-SCNC: 30 MMOL/L (ref 21–32)
CO2 SERPL-SCNC: 31 MMOL/L (ref 21–32)
CREAT BLD-MCNC: 0.55 MG/DL (ref 0.55–1.02)
CREAT BLD-MCNC: 0.56 MG/DL (ref 0.55–1.02)
CREAT BLD-MCNC: 0.57 MG/DL (ref 0.55–1.02)
CREAT BLD-MCNC: 0.6 MG/DL (ref 0.55–1.02)
CREAT BLD-MCNC: 0.6 MG/DL (ref 0.55–1.02)
GLOBULIN PLAS-MCNC: 3.1 G/DL (ref 2.8–4.4)
GLOBULIN PLAS-MCNC: 3.4 G/DL (ref 2.8–4.4)
GLOBULIN PLAS-MCNC: 3.5 G/DL (ref 2.8–4.4)
GLOBULIN PLAS-MCNC: 3.7 G/DL (ref 2.8–4.4)
GLUCOSE BLD-MCNC: 64 MG/DL (ref 70–99)
GLUCOSE BLD-MCNC: 64 MG/DL (ref 70–99)
GLUCOSE BLD-MCNC: 69 MG/DL (ref 70–99)
GLUCOSE BLD-MCNC: 73 MG/DL (ref 70–99)
GLUCOSE BLD-MCNC: 81 MG/DL (ref 70–99)
M PROTEIN MFR SERPL ELPH: 5.2 G/DL (ref 6.4–8.2)
M PROTEIN MFR SERPL ELPH: 5.8 G/DL (ref 6.4–8.2)
M PROTEIN MFR SERPL ELPH: 5.9 G/DL (ref 6.4–8.2)
M PROTEIN MFR SERPL ELPH: 6.1 G/DL (ref 6.4–8.2)
OSMOLALITY SERPL CALC.SUM OF ELEC: 283 MOSM/KG (ref 275–295)
OSMOLALITY SERPL CALC.SUM OF ELEC: 288 MOSM/KG (ref 275–295)
OSMOLALITY SERPL CALC.SUM OF ELEC: 290 MOSM/KG (ref 275–295)
OSMOLALITY SERPL CALC.SUM OF ELEC: 294 MOSM/KG (ref 275–295)
OSMOLALITY SERPL CALC.SUM OF ELEC: 295 MOSM/KG (ref 275–295)
POTASSIUM SERPL-SCNC: 3.6 MMOL/L (ref 3.5–5.1)
POTASSIUM SERPL-SCNC: 3.7 MMOL/L (ref 3.5–5.1)
POTASSIUM SERPL-SCNC: 4 MMOL/L (ref 3.5–5.1)
SODIUM SERPL-SCNC: 137 MMOL/L (ref 136–145)
SODIUM SERPL-SCNC: 139 MMOL/L (ref 136–145)
SODIUM SERPL-SCNC: 140 MMOL/L (ref 136–145)
SODIUM SERPL-SCNC: 142 MMOL/L (ref 136–145)
SODIUM SERPL-SCNC: 142 MMOL/L (ref 136–145)
T4 SERPL-MCNC: 9.5 UG/DL (ref 4.8–13.9)
TSI SER-ACNC: 1.13 MIU/ML (ref 0.36–3.74)

## 2019-01-01 PROCEDURE — 80053 COMPREHEN METABOLIC PANEL: CPT

## 2019-01-01 PROCEDURE — 85025 COMPLETE CBC W/AUTO DIFF WBC: CPT

## 2019-01-01 PROCEDURE — 36415 COLL VENOUS BLD VENIPUNCTURE: CPT

## 2019-01-01 PROCEDURE — 87449 NOS EACH ORGANISM AG IA: CPT | Performed by: INTERNAL MEDICINE

## 2019-01-01 PROCEDURE — 84132 ASSAY OF SERUM POTASSIUM: CPT | Performed by: INTERNAL MEDICINE

## 2019-01-01 PROCEDURE — 87486 CHLMYD PNEUM DNA AMP PROBE: CPT | Performed by: INTERNAL MEDICINE

## 2019-01-01 PROCEDURE — 80048 BASIC METABOLIC PNL TOTAL CA: CPT | Performed by: INTERNAL MEDICINE

## 2019-01-01 PROCEDURE — 84484 ASSAY OF TROPONIN QUANT: CPT | Performed by: EMERGENCY MEDICINE

## 2019-01-01 PROCEDURE — 96365 THER/PROPH/DIAG IV INF INIT: CPT

## 2019-01-01 PROCEDURE — 92610 EVALUATE SWALLOWING FUNCTION: CPT

## 2019-01-01 PROCEDURE — 87086 URINE CULTURE/COLONY COUNT: CPT | Performed by: EMERGENCY MEDICINE

## 2019-01-01 PROCEDURE — 93010 ELECTROCARDIOGRAM REPORT: CPT | Performed by: INTERNAL MEDICINE

## 2019-01-01 PROCEDURE — 83605 ASSAY OF LACTIC ACID: CPT | Performed by: EMERGENCY MEDICINE

## 2019-01-01 PROCEDURE — 85025 COMPLETE CBC W/AUTO DIFF WBC: CPT | Performed by: EMERGENCY MEDICINE

## 2019-01-01 PROCEDURE — 99285 EMERGENCY DEPT VISIT HI MDM: CPT

## 2019-01-01 PROCEDURE — 84484 ASSAY OF TROPONIN QUANT: CPT | Performed by: INTERNAL MEDICINE

## 2019-01-01 PROCEDURE — 87633 RESP VIRUS 12-25 TARGETS: CPT | Performed by: INTERNAL MEDICINE

## 2019-01-01 PROCEDURE — 87040 BLOOD CULTURE FOR BACTERIA: CPT | Performed by: EMERGENCY MEDICINE

## 2019-01-01 PROCEDURE — 80053 COMPREHEN METABOLIC PANEL: CPT | Performed by: EMERGENCY MEDICINE

## 2019-01-01 PROCEDURE — 85025 COMPLETE CBC W/AUTO DIFF WBC: CPT | Performed by: INTERNAL MEDICINE

## 2019-01-01 PROCEDURE — 96375 TX/PRO/DX INJ NEW DRUG ADDON: CPT

## 2019-01-01 PROCEDURE — 87581 M.PNEUMON DNA AMP PROBE: CPT | Performed by: INTERNAL MEDICINE

## 2019-01-01 PROCEDURE — 93005 ELECTROCARDIOGRAM TRACING: CPT

## 2019-01-01 PROCEDURE — 97165 OT EVAL LOW COMPLEX 30 MIN: CPT

## 2019-01-01 PROCEDURE — 93010 ELECTROCARDIOGRAM REPORT: CPT

## 2019-01-01 PROCEDURE — 97530 THERAPEUTIC ACTIVITIES: CPT

## 2019-01-01 PROCEDURE — 84443 ASSAY THYROID STIM HORMONE: CPT

## 2019-01-01 PROCEDURE — 97535 SELF CARE MNGMENT TRAINING: CPT

## 2019-01-01 PROCEDURE — 80048 BASIC METABOLIC PNL TOTAL CA: CPT

## 2019-01-01 PROCEDURE — 87798 DETECT AGENT NOS DNA AMP: CPT | Performed by: INTERNAL MEDICINE

## 2019-01-01 PROCEDURE — 71045 X-RAY EXAM CHEST 1 VIEW: CPT | Performed by: EMERGENCY MEDICINE

## 2019-01-01 PROCEDURE — 97162 PT EVAL MOD COMPLEX 30 MIN: CPT

## 2019-01-01 PROCEDURE — 74177 CT ABD & PELVIS W/CONTRAST: CPT | Performed by: EMERGENCY MEDICINE

## 2019-01-01 PROCEDURE — 84436 ASSAY OF TOTAL THYROXINE: CPT

## 2019-01-01 PROCEDURE — 84145 PROCALCITONIN (PCT): CPT | Performed by: INTERNAL MEDICINE

## 2019-01-01 PROCEDURE — 83735 ASSAY OF MAGNESIUM: CPT | Performed by: HOSPITALIST

## 2019-01-01 PROCEDURE — 81001 URINALYSIS AUTO W/SCOPE: CPT | Performed by: EMERGENCY MEDICINE

## 2019-01-01 PROCEDURE — 96361 HYDRATE IV INFUSION ADD-ON: CPT

## 2019-01-01 RX ORDER — AMOXICILLIN AND CLAVULANATE POTASSIUM 875; 125 MG/1; MG/1
1 TABLET, FILM COATED ORAL 2 TIMES DAILY
Qty: 16 TABLET | Refills: 0 | Status: SHIPPED | OUTPATIENT
Start: 2019-01-01 | End: 2019-01-01

## 2019-01-01 RX ORDER — PREDNISONE 1 MG/1
5 TABLET ORAL DAILY
Status: DISCONTINUED | OUTPATIENT
Start: 2019-01-01 | End: 2019-01-01

## 2019-01-01 RX ORDER — MIRTAZAPINE 15 MG/1
7.5 TABLET, FILM COATED ORAL NIGHTLY
Status: DISCONTINUED | OUTPATIENT
Start: 2019-01-01 | End: 2019-01-01

## 2019-01-01 RX ORDER — LIDOCAINE 4 G/G
1 PATCH TOPICAL DAILY PRN
COMMUNITY

## 2019-01-01 RX ORDER — PANTOPRAZOLE SODIUM 20 MG/1
20 TABLET, DELAYED RELEASE ORAL
Status: DISCONTINUED | OUTPATIENT
Start: 2019-01-01 | End: 2019-01-01

## 2019-01-01 RX ORDER — GARLIC EXTRACT 500 MG
1 CAPSULE ORAL DAILY
Qty: 8 CAPSULE | Refills: 0 | Status: SHIPPED | OUTPATIENT
Start: 2019-01-01

## 2019-01-01 RX ORDER — OMEPRAZOLE 20 MG/1
20 CAPSULE, DELAYED RELEASE ORAL
COMMUNITY

## 2019-01-01 RX ORDER — POTASSIUM CHLORIDE 20 MEQ/1
40 TABLET, EXTENDED RELEASE ORAL EVERY 4 HOURS
Status: COMPLETED | OUTPATIENT
Start: 2019-01-01 | End: 2019-01-01

## 2019-01-01 RX ORDER — PRAMIPEXOLE DIHYDROCHLORIDE 0.25 MG/1
0.5 TABLET ORAL EVERY EVENING
COMMUNITY

## 2019-01-01 RX ORDER — TRAMADOL HYDROCHLORIDE 50 MG/1
50 TABLET ORAL DAILY
COMMUNITY

## 2019-01-01 RX ORDER — ALPRAZOLAM 0.25 MG/1
0.12 TABLET ORAL NIGHTLY PRN
Status: DISCONTINUED | OUTPATIENT
Start: 2019-01-01 | End: 2019-01-01

## 2019-01-01 RX ORDER — CHOLECALCIFEROL (VITAMIN D3) 1250 MCG
50000 CAPSULE ORAL
COMMUNITY

## 2019-01-01 RX ORDER — LOPERAMIDE HYDROCHLORIDE 2 MG/1
2 CAPSULE ORAL 4 TIMES DAILY PRN
Status: ON HOLD | COMMUNITY
End: 2019-01-01

## 2019-01-01 RX ORDER — ONDANSETRON 2 MG/ML
4 INJECTION INTRAMUSCULAR; INTRAVENOUS ONCE
Status: COMPLETED | OUTPATIENT
Start: 2019-01-01 | End: 2019-01-01

## 2019-01-01 RX ORDER — ONDANSETRON 4 MG/1
4 TABLET, FILM COATED ORAL EVERY 6 HOURS PRN
COMMUNITY

## 2019-01-01 RX ORDER — ACETAMINOPHEN 325 MG/1
650 TABLET ORAL EVERY 6 HOURS PRN
Status: DISCONTINUED | OUTPATIENT
Start: 2019-01-01 | End: 2019-01-01

## 2019-01-01 RX ORDER — PRAMIPEXOLE DIHYDROCHLORIDE 0.25 MG/1
0.25 TABLET ORAL DAILY
Status: DISCONTINUED | OUTPATIENT
Start: 2019-01-01 | End: 2019-01-01

## 2019-01-01 RX ORDER — ALPRAZOLAM 0.25 MG/1
0.12 TABLET ORAL EVERY MORNING
COMMUNITY

## 2019-01-01 RX ORDER — PRAMIPEXOLE DIHYDROCHLORIDE 0.25 MG/1
0.25 TABLET ORAL 2 TIMES DAILY
Status: DISCONTINUED | OUTPATIENT
Start: 2019-01-01 | End: 2019-01-01

## 2019-01-01 RX ORDER — TRAMADOL HYDROCHLORIDE 50 MG/1
50 TABLET ORAL EVERY 6 HOURS PRN
COMMUNITY

## 2019-01-01 RX ORDER — PREDNISONE 1 MG/1
5 TABLET ORAL DAILY
COMMUNITY

## 2019-01-01 RX ORDER — FUROSEMIDE 40 MG/1
40 TABLET ORAL DAILY
Status: DISCONTINUED | OUTPATIENT
Start: 2019-01-01 | End: 2019-01-01

## 2019-01-01 RX ORDER — ASPIRIN 81 MG/1
81 TABLET, CHEWABLE ORAL DAILY
Status: DISCONTINUED | OUTPATIENT
Start: 2019-01-01 | End: 2019-01-01

## 2019-01-01 RX ORDER — AMOXICILLIN AND CLAVULANATE POTASSIUM 875; 125 MG/1; MG/1
1 TABLET, FILM COATED ORAL 2 TIMES DAILY
Status: DISCONTINUED | OUTPATIENT
Start: 2019-01-01 | End: 2019-01-01

## 2019-01-01 RX ORDER — GARLIC EXTRACT 500 MG
1 CAPSULE ORAL DAILY
Status: DISCONTINUED | OUTPATIENT
Start: 2019-01-01 | End: 2019-01-01

## 2019-01-01 RX ORDER — LEVOTHYROXINE SODIUM 0.07 MG/1
75 TABLET ORAL
Status: DISCONTINUED | OUTPATIENT
Start: 2019-01-01 | End: 2019-01-01

## 2019-01-01 RX ORDER — SODIUM CHLORIDE 9 MG/ML
INJECTION, SOLUTION INTRAVENOUS CONTINUOUS
Status: DISCONTINUED | OUTPATIENT
Start: 2019-01-01 | End: 2019-01-01

## 2019-01-01 RX ORDER — METOPROLOL SUCCINATE 25 MG/1
25 TABLET, EXTENDED RELEASE ORAL
Status: DISCONTINUED | OUTPATIENT
Start: 2019-01-01 | End: 2019-01-01

## 2019-01-01 RX ORDER — PRAMIPEXOLE DIHYDROCHLORIDE 0.25 MG/1
0.25 TABLET ORAL DAILY
COMMUNITY

## 2019-01-01 RX ORDER — LOPERAMIDE HYDROCHLORIDE 2 MG/1
2 TABLET ORAL EVERY 12 HOURS PRN
COMMUNITY

## 2019-01-01 RX ORDER — ONDANSETRON 4 MG/1
4 TABLET, ORALLY DISINTEGRATING ORAL EVERY 6 HOURS PRN
Status: ON HOLD | COMMUNITY
End: 2019-01-01

## 2019-01-01 RX ORDER — PRAMIPEXOLE DIHYDROCHLORIDE 0.25 MG/1
0.5 TABLET ORAL EVERY EVENING
Status: DISCONTINUED | OUTPATIENT
Start: 2019-01-01 | End: 2019-01-01

## 2019-01-01 RX ORDER — TRAMADOL HYDROCHLORIDE 50 MG/1
50 TABLET ORAL EVERY 6 HOURS PRN
Status: DISCONTINUED | OUTPATIENT
Start: 2019-01-01 | End: 2019-01-01

## 2019-01-01 RX ORDER — ASPIRIN 81 MG/1
81 TABLET ORAL DAILY
COMMUNITY

## 2019-01-01 RX ORDER — ACETAMINOPHEN 325 MG/1
650 TABLET ORAL 3 TIMES DAILY PRN
COMMUNITY

## 2019-01-01 RX ORDER — TRAMADOL HYDROCHLORIDE 50 MG/1
50 TABLET ORAL DAILY
Status: DISCONTINUED | OUTPATIENT
Start: 2019-01-01 | End: 2019-01-01

## 2019-01-01 RX ORDER — ASPIRIN 81 MG/1
81 TABLET ORAL DAILY
Status: DISCONTINUED | OUTPATIENT
Start: 2019-01-01 | End: 2019-01-01

## 2019-10-03 PROBLEM — N30.00 ACUTE CYSTITIS WITHOUT HEMATURIA: Status: ACTIVE | Noted: 2019-01-01

## 2019-10-03 PROBLEM — J18.9 COMMUNITY ACQUIRED PNEUMONIA, UNSPECIFIED LATERALITY: Status: ACTIVE | Noted: 2019-01-01

## 2019-10-03 PROBLEM — K52.9 GASTROENTERITIS: Status: ACTIVE | Noted: 2019-01-01

## 2019-10-03 NOTE — ED NOTES
Pt and family advised room assignment and that the room is being cleaned at this time. Apologized for delay. Pt resting.

## 2019-10-03 NOTE — PROGRESS NOTES
NURSING ADMISSION NOTE      Patient admitted via ER cart. Oriented to room. Safety precautions initiated. Bed in low position. Call light in reach. Admission database complete.

## 2019-10-03 NOTE — ED NOTES
Spoke to Ai RN to give report, nurse reports room is clean and asked if I could call back in 10m to give report

## 2019-10-03 NOTE — H&P
[facility] Hospitalist History and Physical      Patient presents with:  Fever (infectious)  Nausea/Vomiting/Diarrhea (gastrointestinal)       PCP: Akiko Hardy MD      History of Present Illness: Patient is a 80year old female with PMH sig for severe AS, aAF, S • Pulmonary embolism (HCC)    • Restless leg syndrome    • Rheumatoid arthritis(714.0)    • Systemic lupus erythematosus ([facility] [address] 12/9/2012   • Urinary tract infection due to ESBL Klebsiella    • Visual impairment       Past Surgical History:   Procedure La Potassium Chloride ER 20 MEQ Oral Tab CR Take 1 tablet (20 mEq total) by mouth daily. Disp:  Rfl:    Pramipexole Dihydrochloride (MIRAPEX) 0.5 MG Oral Tab Take 0.25 mg by mouth 2 (two) times daily.  And 0.5-1 tablet as needed for RLS  Disp:  Rfl:    aceta Heart with regular rate and rhythm, + murmur. Normal PMI. Abd: Abdomen soft, nontender, nondistended, no organomegaly, bowel sounds present  MSK: Trace LE edema.   Skin: no rashes or lesions  Neuro:  Grossly intact, no focal deficits      Data Review: adrenal glands, pancreas. Small cyst right posterior lateral mid kidney about 8 mm. 7 mm cyst lower pole left kidney, and additional tiny cysts in the kidneys.   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 No evidence for  rupture or acute retroperitoneal bleeding.   Given the increase in size, with the size measurements give UCx    #Severe AS  #LE edema  - hold lasix for now  - monitor volume status     #Elevated trop  - mild, likely due to demand   - repeat at now and in AM    #Paroxsymal a-fib  - hold BB for now    #RLS  - cont mirapex    #Generalized anxiety d/o  - cont beth

## 2019-10-03 NOTE — ED PROVIDER NOTES
Patient Seen in: [facility] Emergency Department      History   Patient presents with:  Fever (infectious)  Nausea/Vomiting/Diarrhea (gastrointestinal)    Stated Complaint: fever, nvd, weakness    HPI    42-year-old female comes to the hospital with infection due to ESBL Klebsiella    • Visual impairment               Past Surgical History:   Procedure Laterality Date   • ANGIOPLASTY (CORONARY)  3/11/14    80-90% RCAp s/p 4.0 x 23 mm Vision, with stent    • [address] [place] [address] Street URINALYSIS WITH CULTURE REFLEX - Abnormal; Notable for the following components:    Clarity Urine Hazy (*)     Spec Gravity 1.055 (*)     Blood Urine Small (*)     Leukocyte Esterase Urine Large (*)     WBC Urine >50 (*)     RBC URINE 6-10 (*)     [place] to the pubic symphysis with non-ionic intravenous contrast material. Post contrast coronal MPR imaging was performed. Dose reduction techniques were used.  Dose information is transmitted to the [facility] ([facility]) Ruslan Velazquez [address] [place] [address] Radiology calcifications involve the iliac arteries, but no iliac artery aneurysm. No retroperitoneal hematoma identified. Diverticula sigmoid colon, no sign of acute diverticulitis.   No signs of colitis, colonic obstruction, small bowel obstruction, ascites or fr plaque. Degenerative changes in the spine. CONCLUSION:  Airspace disease in the mid to lower right lung is concerning for pneumonia. Clinical correlation recommended.      Dictated by: Patrick Villafana MD on 10/03/2019 at 9:49     Approved by: Socorro Velasquez Admission  Date Reviewed: 8/26/2019          ICD-10-CM Noted POA    Gastroenteritis K52.9 10/3/2019 Unknown

## 2019-10-03 NOTE — ED INITIAL ASSESSMENT (HPI)
Patient presents with c/o fever, NVD. She did not receive Tylenol from the nursing home. She was slightly hypotensive for EMS and received IVF bolus with resulting SBP around 100.

## 2019-10-04 NOTE — CM/SW NOTE
10/04/19 1000   CM/SW Screening   Referral Source Social Work (self-referral)   [facility] staff; Chart review;Nursing rounds   Patient's Mental Status Alert;Oriented   Patient's [address] Name MEGAN

## 2019-10-04 NOTE — PLAN OF CARE
Dr. Wright Neither notified that patient converted to afib from sinus rhythm: HR is low 100's. Orders received to re start beta blocker.

## 2019-10-04 NOTE — OCCUPATIONAL THERAPY NOTE
OCCUPATIONAL THERAPY QUICK EVALUATION - INPATIENT    Room Number: 4293/5044-K  Evaluation Date: 10/4/2019     Type of Evaluation: Quick Eval  Presenting Problem: pneumonia, cystitis, gastroenteritis    Physician Order: IP Consult to Occupational Therapy  R unspecified(486)    • Pulmonary embolism (HCC)    • Restless leg syndrome    • Rheumatoid arthritis(714.0)    • Systemic lupus erythematosus ([facility] [address] 12/9/2012   • Urinary tract infection due to ESBL Klebsiella    • Visual impairment        Past Surgical Hist rinsing, drying)?: A Little  -   Toileting, which includes using toilet, bedpan or urinal? : A Little  -   Putting on and taking off regular upper body clothing?: A Little  -   Taking care of personal grooming such as brushing teeth?: None  -   Eating meal evaluated and presents with no skilled Occupational Therapy needs at this time. Patient discharged from Occupational Therapy services. Please re-order if a new functional limitation presents during this admission.     Patient was able to achieve the follo

## 2019-10-04 NOTE — PLAN OF CARE
Problem: Patient/Family Goals  Goal: Patient/Family Long Term Goal  Description  Patient's Long Term Goal: to go home    Interventions:  - increase activity level: UP IN CHAIR FOR MEALS  Ambulate in hallway with walker and standby assist TID  - See addit Discontinue feeding and notify MD (or speech pathologist) if coughing or persistent throat clearing or wet/gurgly vocal quality is noted  Outcome: Progressing     Problem: Impaired Activities of Daily Living  Goal: Achieve highest/safest level of independe

## 2019-10-04 NOTE — PHYSICAL THERAPY NOTE
Attempted PT evaluation, RN requesting to hold at this time as pt went into A-fib with AM. Will continue to follow.

## 2019-10-04 NOTE — PLAN OF CARE
Problem: Impaired Swallowing  Goal: Minimize aspiration risk  Description  Interventions:  - Patient should be alert and upright for all feedings (90 degrees preferred)  - Offer food and liquids at a slow rate  - No straws  - Encourage small bites of rubén

## 2019-10-04 NOTE — PHYSICAL THERAPY NOTE
PHYSICAL THERAPY EVALUATION - INPATIENT     Room Number: 5922/4596-L  Evaluation Date: 10/4/2019  Type of Evaluation: Initial  Physician Order: PT Eval and Treat    Presenting Problem: PNA  Reason for Therapy: Mobility Dysfunction and Discharge Plann (CORONARY)  3/11/14    80-90% RCAp s/p 4.0 x 23 mm Vision, with stent    • HYSTERECTOMY  1977   • VENA CAVA FILTER  03/06/2018       HOME SITUATION  Type of Home: Assisted living facility   Home Layout: One level                Lives With: Staff 24 hours bedside commode, etc.): A Little   -   Moving from lying on back to sitting on the side of the bed?: A Little   How much help from another person does the patient currently need. ..   -   Moving to and from a bed to a chair (including a wheelchair)?: A Anton function. Recommend return to [facility]. Pt will require supervision for transfers and ambulation with RW at this time. DISCHARGE RECOMMENDATIONS  PT Discharge Recommendations: Home with home health PT; Intermittent Supervision    PLAN  PT Treatment Plan:

## 2019-10-04 NOTE — PLAN OF CARE
Patient is alert and oriented times four. She is very weak but improving Lungs with crackles 1/2 down bilaterally. IV of 0.9 NS infusing at 83 cc/hr. Pt. Is now in afib (she has a history of afib). Heart rate is 105 on monitor.  Lower extremities with 1 plu

## 2019-10-04 NOTE — PROGRESS NOTES
[facility] Hospitalist Progress Note                                                                   [facility] A Burns  10/1/1924    SUBJECTIVE:  Pt seen and examined.   States she is feeling much Gastroenteritis  Active Problems:    Elevated troponin    Hypotension, unspecified hypotension type    Acute cystitis without hematuria    Community acquired pneumonia, unspecified laterality      80 yr old female with PMH sig for severe AS, aAF, SLE, HTN,

## 2019-10-04 NOTE — OCCUPATIONAL THERAPY NOTE
Attempted to see the pt for OT evaluation. Per RN, pt with A-fib. Suggested therapy to follow-up later. Will continue to follow.

## 2019-10-04 NOTE — SLP NOTE
ADULT SWALLOWING EVALUATION    ASSESSMENT    ASSESSMENT/OVERALL IMPRESSION:  Order received for BSE to r/o aspiration. Chart reviewed. Pt known to this service from Aug 2018 admission for nausea/vomiting/PNA.   Pt currently admitted from long term due to c/o naus tolerance of PO diet clinically and feel that patient may have just aspirated when she was vomiting. Recommend con't PO diet as tolerated with standard aspiration precautions and no straw use with liquids.   Recommend pills one at a time, whole with ap unspecified whether generalized or localized, unspecified site    • Other and unspecified hyperlipidemia    • Personal history of other musculoskeletal disorders(V13.59)     RLS   • Pneumonia, organism unspecified(486)    • Pulmonary embolism ([facility] [address]    • Re Swallow:  Within Functional Limits     Pharyngeal Phase of Swallow: Impaired  Laryngeal Elevation Timing: Appears impaired  Laryngeal Elevation Strength: Appears intact  Laryngeal Elevation Coordination: Appears intact  (Please note: Silent aspiration kendrao

## 2019-10-05 NOTE — PROGRESS NOTES
[facility] Pharmacy Note:  amoxicillin/clavulonate (AUGMENTIN)    Michal Halsted is a 80year old female who has been prescribed amoxicillin/clavulonate (AUGMENTIN) pharmacy to dose.  CrCl is estimated creatinine clearance is 43.2 mL/min (based on SCr of 0.56 m

## 2019-10-05 NOTE — PLAN OF CARE
Received patient, alert and oriented. Kept getting in and out of bed and chair. Denied any pain, denied SOB. Discussed POC. Due meds given. Safety measures reinforced, call light within reach. Bed and chair alarm on. Needs attended to.  Will continue to mon risk  Description  Interventions:  - Patient should be alert and upright for all feedings (90 degrees preferred)  - Offer food and liquids at a slow rate  - No straws  - Encourage small bites of food and small sips of liquid  - Offer pills one at a time, c

## 2019-10-05 NOTE — PROGRESS NOTES
[facility] hospitalist daily note  Seen/examined on 10/5/19    S; no chest pain, no SOB, no abd pain, no nausea/emesis, no diarrhea  No dizziness, no burning with urinating at this time, no bleeding  In am BP was down to 60s. Per pt asymptomatic.   Daughter at the

## 2019-10-05 NOTE — CONSULTS
[facility] Cardiology  Consultation Note      Mickiel Spatz Patient Status:  Inpatient    10/1/1924 MRN [id number]   [facility] 8NE-A Attending Saint Hausen, MD   Hosp Day # 2 PCP Josue Bell MD     Reason for consultation: tab 40 mg 40 mg Oral Daily   Pramipexole Dihydrochloride (MIRAPEX) tab 0.5 mg 0.5 mg Oral QPM   traMADol HCl (ULTRAM) tab 50 mg 50 mg Oral Daily   traMADol HCl (ULTRAM) tab 50 mg 50 mg Oral Q6H PRN   Pramipexole Dihydrochloride (MIRAPEX) tab 0.25 mg 0.25 m mm Vision, with stent    • HYSTERECTOMY  1977   • VENA CAVA FILTER  03/06/2018       Family History  family history includes Heart Disorder (age of onset: 59) in her father; Hypertension in her mother; Other in her daughter.     Social History   reports mylene cyanosis; moves all 4 extremities normally  Psychiatric: Normal mood and affect; answers questions appropriately  Dermatologic: No rashes; normal skin turgor    Diagnostic testing:    EKG: Normal sinus rhythm    Labs:   Lab Results   Component Value Date

## 2019-10-05 NOTE — DIETARY NOTE
[facility] [place]     Admitting diagnosis:  Gastroenteritis [K52.9]  Elevated troponin [R79.89]  Acute cystitis without hematuria [N30.00]  Hypotension, unspecified hypotension type [I95.9]  Community acquired pneumo

## 2019-10-05 NOTE — PLAN OF CARE
Problem: Patient/Family Goals  Goal: Patient/Family Long Term Goal  Description  Patient's Long Term Goal: to go assisted living    Interventions:  -  - See additional Care Plan goals for specific interventions  Outcome: Adequate for Discharge  Goal: Hellen Ortez notify MD (or speech pathologist) if coughing or persistent throat clearing or wet/gurgly vocal quality is noted  Outcome: Adequate for Discharge     Problem: Impaired Activities of Daily Living  Goal: Achieve highest/safest level of independence in self c

## 2019-10-06 NOTE — PLAN OF CARE
NURSING DISCHARGE NOTE    Discharged Other, (see nursing note) via Wheelchair. Accompanied by Support staff  Belongings Taken by patient/family. Discharge instructions given to patient and her daughter.

## 2019-10-06 NOTE — PLAN OF CARE
Pt. Is alert and oriented times four. Lungs clear on auscultation. Pt. Is sinus rhythm on monitor this AM. Pt. Is anxious to go home.

## 2019-10-07 NOTE — DISCHARGE SUMMARY
[facility]  Discharge Summary    Shine Smith Patient Status:  Inpatient    10/1/1924 MRN [id number]   [facility] 8NE-A Attending No att. providers found   Hosp Day # 2 PCP Vickey Amezquita MD     Date of Admission: 10/3/2019    Date Abx     Abnormal UA urine culture <10,000 bactereia. Pt denies dysuria     Elevated troponin, hypotension, severe AS, paroxysmal Afib  Consult cardiology     Anemia;  Hg without significant change from 10/4/19  Pt denies bleeding     RA, RLS meds ordered    capsule, R-0    Amoxicillin-Pot Clavulanate 875-125 MG Oral Tab  Take 1 tablet by mouth 2 (two) times daily for 8 days. , Print Script, Disp-16 tablet, R-0      CONTINUE these medications which have NOT CHANGED    acetaminophen 325 MG Oral Tab  Take 650 mg Oral Tab CR  Take 1 tablet (20 mEq total) by mouth daily. , Historical    Levothyroxine Sodium 75 MCG Oral Tab  Take 75 mcg by mouth before breakfast.  , Historical    Diclofenac Sodium 25 MG Oral Tab EC  Take 25 mg by mouth daily. , Historical    mirtazapin urinating or defecating  11. bleeding    Do not drive when taking pain medications  Do not exceed 4 grams acetaminophen within 24 hours      Иван Linda  10/6/2019  9:57 PM

## 2019-10-15 NOTE — CDS QUERY
Clarification – Significance of Clinical Findings  CLINICAL DOCUMENTATION CLARIFICATION FORM  Dear Doctor:  Clinical information (provided below) suggests a diagnosis in a diagnostic report and/or results.  For accurate ICD-10-CM code assignment to reflect

## 2024-01-01 NOTE — PLAN OF CARE
Assumed care of pt @ 2300. AOx4. O2 sats maintained on 3 L NC, ALBRIGHT. NSR on tele. Pt denies pain. Contact plus Isolation continued for R/O cdiff. IVF infusing as ordered. IV zosyn. Urine collected and sent for legionella and strep. Afebrile.  Plan for labs i Waizy message also sent which has been routed to Heather and Dr. Orlando to advise. Call also routed in case providers wanted to call mom.

## (undated) NOTE — ED AVS SNAPSHOT
Pascale Vidal   MRN: [id number]    Department:  [facility] Emergency Department   Date of Visit:  2/2/2018           Disclosure     Insurance plans vary and the physician(s) referred by the ER may not be covered by your plan.  Please contact yo tell this physician (or your personal doctor if your instructions are to return to your personal doctor) about any new or lasting problems. The primary care or specialist physician will see patients referred from the [facility] Emergency Department.  Cheri Kurtz

## (undated) NOTE — ED AVS SNAPSHOT
Michal Halsted   MRN: [id number]    Department:  [facility] Emergency Department   Date of Visit:  1/8/2018           Disclosure     Insurance plans vary and the physician(s) referred by the ER may not be covered by your plan.  Please contact yo tell this physician (or your personal doctor if your instructions are to return to your personal doctor) about any new or lasting problems. The primary care or specialist physician will see patients referred from the [facility] Emergency Department.  Shelton Lombard

## (undated) NOTE — ED AVS SNAPSHOT
Kimberli Charlton   MRN: [id number]    Department:  [facility] Emergency Department   Date of Visit:  1/21/2018           Disclosure     Insurance plans vary and the physician(s) referred by the ER may not be covered by your plan.  Please contact y tell this physician (or your personal doctor if your instructions are to return to your personal doctor) about any new or lasting problems. The primary care or specialist physician will see patients referred from the [facility] Emergency Department.  Eliceo Leigh

## (undated) NOTE — ED AVS SNAPSHOT
Pascale Vidal   MRN: [id number]    Department:  [facility] Emergency Department   Date of Visit:  3/31/2018           Disclosure     Insurance plans vary and the physician(s) referred by the ER may not be covered by your plan.  Please contact y tell this physician (or your personal doctor if your instructions are to return to your personal doctor) about any new or lasting problems. The primary care or specialist physician will see patients referred from the [facility] Emergency Department.  Wilson Lam

## (undated) NOTE — ED AVS SNAPSHOT
Pascale Vidal   MRN: [id number]    Department:  [facility] Emergency Department   Date of Visit:  9/27/2018           Disclosure     Insurance plans vary and the physician(s) referred by the ER may not be covered by your plan.  Please contact y tell this physician (or your personal doctor if your instructions are to return to your personal doctor) about any new or lasting problems. The primary care or specialist physician will see patients referred from the [facility] Emergency Department.  Anayeli Parker